# Patient Record
Sex: MALE
[De-identification: names, ages, dates, MRNs, and addresses within clinical notes are randomized per-mention and may not be internally consistent; named-entity substitution may affect disease eponyms.]

---

## 2018-11-18 ENCOUNTER — HOSPITAL ENCOUNTER (INPATIENT)
Dept: HOSPITAL 90 - EDH | Age: 33
LOS: 12 days | Discharge: HOME | DRG: 854 | End: 2018-11-30
Attending: FAMILY MEDICINE | Admitting: FAMILY MEDICINE
Payer: COMMERCIAL

## 2018-11-18 VITALS — SYSTOLIC BLOOD PRESSURE: 162 MMHG | DIASTOLIC BLOOD PRESSURE: 89 MMHG

## 2018-11-18 VITALS — DIASTOLIC BLOOD PRESSURE: 73 MMHG | SYSTOLIC BLOOD PRESSURE: 135 MMHG

## 2018-11-18 VITALS — HEIGHT: 74 IN | WEIGHT: 310.1 LBS | BODY MASS INDEX: 39.8 KG/M2

## 2018-11-18 VITALS — SYSTOLIC BLOOD PRESSURE: 160 MMHG | DIASTOLIC BLOOD PRESSURE: 85 MMHG

## 2018-11-18 DIAGNOSIS — E11.69: ICD-10-CM

## 2018-11-18 DIAGNOSIS — M86.171: ICD-10-CM

## 2018-11-18 DIAGNOSIS — L97.519: ICD-10-CM

## 2018-11-18 DIAGNOSIS — Z79.01: ICD-10-CM

## 2018-11-18 DIAGNOSIS — L97.529: ICD-10-CM

## 2018-11-18 DIAGNOSIS — B95.4: ICD-10-CM

## 2018-11-18 DIAGNOSIS — Z91.14: ICD-10-CM

## 2018-11-18 DIAGNOSIS — E66.01: ICD-10-CM

## 2018-11-18 DIAGNOSIS — I10: ICD-10-CM

## 2018-11-18 DIAGNOSIS — E87.6: ICD-10-CM

## 2018-11-18 DIAGNOSIS — E11.65: ICD-10-CM

## 2018-11-18 DIAGNOSIS — B96.6: ICD-10-CM

## 2018-11-18 DIAGNOSIS — Z89.429: ICD-10-CM

## 2018-11-18 DIAGNOSIS — Z83.3: ICD-10-CM

## 2018-11-18 DIAGNOSIS — B95.8: ICD-10-CM

## 2018-11-18 DIAGNOSIS — B96.4: ICD-10-CM

## 2018-11-18 DIAGNOSIS — L02.611: ICD-10-CM

## 2018-11-18 DIAGNOSIS — E78.5: ICD-10-CM

## 2018-11-18 DIAGNOSIS — Z87.891: ICD-10-CM

## 2018-11-18 DIAGNOSIS — E11.621: ICD-10-CM

## 2018-11-18 DIAGNOSIS — Z86.718: ICD-10-CM

## 2018-11-18 DIAGNOSIS — Z79.4: ICD-10-CM

## 2018-11-18 DIAGNOSIS — L03.116: ICD-10-CM

## 2018-11-18 DIAGNOSIS — B95.1: ICD-10-CM

## 2018-11-18 DIAGNOSIS — A41.9: Primary | ICD-10-CM

## 2018-11-18 DIAGNOSIS — B95.2: ICD-10-CM

## 2018-11-18 DIAGNOSIS — Z88.8: ICD-10-CM

## 2018-11-18 DIAGNOSIS — K52.9: ICD-10-CM

## 2018-11-18 DIAGNOSIS — L03.115: ICD-10-CM

## 2018-11-18 LAB
ALBUMIN SERPL-MCNC: 3.1 G/DL (ref 3.5–5)
ALT SERPL-CCNC: 18 U/L (ref 12–78)
APPEARANCE UR: CLEAR
AST SERPL-CCNC: 12 U/L (ref 10–37)
BASOPHILS NFR BLD AUTO: 0.4 % (ref 0–5)
BILIRUB SERPL-MCNC: 1.1 MG/DL (ref 0.2–1)
BILIRUB UR QL STRIP: NEGATIVE
BUN SERPL-MCNC: 18 MG/DL (ref 7–18)
CHLORIDE SERPL-SCNC: 93 MMOL/L (ref 101–111)
CO2 SERPL-SCNC: 29 MMOL/L (ref 21–32)
COLOR UR: YELLOW
CREAT SERPL-MCNC: 1.2 MG/DL (ref 0.5–1.5)
EOSINOPHIL NFR BLD AUTO: 0.3 % (ref 0–8)
ERYTHROCYTE [DISTWIDTH] IN BLOOD BY AUTOMATED COUNT: 13.4 % (ref 11–15.5)
GFR SERPL CREATININE-BSD FRML MDRD: 73 ML/MIN (ref 60–?)
GLUCOSE SERPL-MCNC: 460 MG/DL (ref 70–105)
GLUCOSE UR STRIP-MCNC: >=1000 MG/DL
HCT VFR BLD AUTO: 32.1 % (ref 42–54)
HGB UR QL STRIP: (no result)
KETONES UR STRIP-MCNC: 15 MG/DL
LEUKOCYTE ESTERASE UR QL STRIP: NEGATIVE
LYMPHOCYTES NFR SPEC AUTO: 5.5 % (ref 21–51)
MCH RBC QN AUTO: 25.6 PG (ref 27–33)
MCHC RBC AUTO-ENTMCNC: 32.3 G/DL (ref 32–36)
MCV RBC AUTO: 79.5 FL (ref 79–99)
MONOCYTES NFR BLD AUTO: 5.1 % (ref 3–13)
NEUTROPHILS NFR BLD AUTO: 88.7 % (ref 40–77)
NITRITE UR QL STRIP: NEGATIVE
NRBC BLD MANUAL-RTO: 0 % (ref 0–0.19)
PH UR STRIP: 5.5 [PH] (ref 5–8)
PLATELET # BLD AUTO: 289 K/UL (ref 130–400)
POTASSIUM SERPL-SCNC: 3.7 MMOL/L (ref 3.5–5.1)
PROT SERPL-MCNC: 8.2 G/DL (ref 6–8.3)
PROT UR QL STRIP: 30
RBC # BLD AUTO: 4.04 MIL/UL (ref 4.5–6.2)
RBC #/AREA URNS HPF: (no result) /HPF (ref 0–1)
SODIUM SERPL-SCNC: 130 MMOL/L (ref 136–145)
SP GR UR STRIP: 1.01 (ref 1–1.03)
UROBILINOGEN UR STRIP-MCNC: 1 MG/DL (ref 0.2–1)
WBC # BLD AUTO: 16.9 K/UL (ref 4.8–10.8)

## 2018-11-18 PROCEDURE — 99291 CRITICAL CARE FIRST HOUR: CPT

## 2018-11-18 PROCEDURE — 97039 UNLISTED MODALITY: CPT

## 2018-11-18 PROCEDURE — 83690 ASSAY OF LIPASE: CPT

## 2018-11-18 PROCEDURE — 93005 ELECTROCARDIOGRAM TRACING: CPT

## 2018-11-18 PROCEDURE — 87186 SC STD MICRODIL/AGAR DIL: CPT

## 2018-11-18 PROCEDURE — 74176 CT ABD & PELVIS W/O CONTRAST: CPT

## 2018-11-18 PROCEDURE — 87070 CULTURE OTHR SPECIMN AEROBIC: CPT

## 2018-11-18 PROCEDURE — 81001 URINALYSIS AUTO W/SCOPE: CPT

## 2018-11-18 PROCEDURE — 87804 INFLUENZA ASSAY W/OPTIC: CPT

## 2018-11-18 PROCEDURE — 85027 COMPLETE CBC AUTOMATED: CPT

## 2018-11-18 PROCEDURE — 87077 CULTURE AEROBIC IDENTIFY: CPT

## 2018-11-18 PROCEDURE — 73718 MRI LOWER EXTREMITY W/O DYE: CPT

## 2018-11-18 PROCEDURE — 88305 TISSUE EXAM BY PATHOLOGIST: CPT

## 2018-11-18 PROCEDURE — 93925 LOWER EXTREMITY STUDY: CPT

## 2018-11-18 PROCEDURE — 36415 COLL VENOUS BLD VENIPUNCTURE: CPT

## 2018-11-18 PROCEDURE — 80053 COMPREHEN METABOLIC PANEL: CPT

## 2018-11-18 PROCEDURE — 85025 COMPLETE CBC W/AUTO DIFF WBC: CPT

## 2018-11-18 PROCEDURE — 88311 DECALCIFY TISSUE: CPT

## 2018-11-18 PROCEDURE — 87507 IADNA-DNA/RNA PROBE TQ 12-25: CPT

## 2018-11-18 PROCEDURE — 87076 CULTURE ANAEROBE IDENT EACH: CPT

## 2018-11-18 PROCEDURE — 73630 X-RAY EXAM OF FOOT: CPT

## 2018-11-18 PROCEDURE — 80202 ASSAY OF VANCOMYCIN: CPT

## 2018-11-18 PROCEDURE — 82948 REAGENT STRIP/BLOOD GLUCOSE: CPT

## 2018-11-18 PROCEDURE — 87040 BLOOD CULTURE FOR BACTERIA: CPT

## 2018-11-18 PROCEDURE — 87205 SMEAR GRAM STAIN: CPT

## 2018-11-18 PROCEDURE — 80048 BASIC METABOLIC PNL TOTAL CA: CPT

## 2018-11-18 RX ADMIN — SODIUM CHLORIDE PRN MG: 9 INJECTION, SOLUTION INTRAVENOUS at 23:35

## 2018-11-18 RX ADMIN — SODIUM CHLORIDE SCH MLS/HR: 0.9 INJECTION, SOLUTION INTRAVENOUS at 22:15

## 2018-11-18 RX ADMIN — SODIUM CHLORIDE SCH UNIT: 9 INJECTION, SOLUTION INTRAVENOUS at 21:00

## 2018-11-18 RX ADMIN — FAMOTIDINE SCH MG: 10 INJECTION INTRAVENOUS at 22:14

## 2018-11-18 RX ADMIN — PIPERACILLIN SODIUM AND TAZOBACTAM SODIUM SCH MLS/HR: .375; 3 INJECTION, POWDER, LYOPHILIZED, FOR SOLUTION INTRAVENOUS at 22:14

## 2018-11-18 RX ADMIN — ACETAMINOPHEN PRN MG: 325 TABLET, FILM COATED ORAL at 23:35

## 2018-11-18 RX ADMIN — APIXABAN SCH MG: 5 TABLET, FILM COATED ORAL at 22:14

## 2018-11-18 RX ADMIN — SODIUM CHLORIDE SCH MLS/HR: 0.9 INJECTION, SOLUTION INTRAVENOUS at 23:49

## 2018-11-18 RX ADMIN — SODIUM CHLORIDE SCH UNIT: 9 INJECTION, SOLUTION INTRAVENOUS at 22:41

## 2018-11-19 VITALS — DIASTOLIC BLOOD PRESSURE: 100 MMHG | SYSTOLIC BLOOD PRESSURE: 171 MMHG

## 2018-11-19 VITALS — SYSTOLIC BLOOD PRESSURE: 121 MMHG | DIASTOLIC BLOOD PRESSURE: 83 MMHG

## 2018-11-19 VITALS — DIASTOLIC BLOOD PRESSURE: 81 MMHG | SYSTOLIC BLOOD PRESSURE: 149 MMHG

## 2018-11-19 VITALS — DIASTOLIC BLOOD PRESSURE: 77 MMHG | SYSTOLIC BLOOD PRESSURE: 136 MMHG

## 2018-11-19 VITALS — SYSTOLIC BLOOD PRESSURE: 143 MMHG | DIASTOLIC BLOOD PRESSURE: 83 MMHG

## 2018-11-19 VITALS — DIASTOLIC BLOOD PRESSURE: 97 MMHG | SYSTOLIC BLOOD PRESSURE: 171 MMHG

## 2018-11-19 LAB
BUN SERPL-MCNC: 16 MG/DL (ref 7–18)
CHLORIDE SERPL-SCNC: 96 MMOL/L (ref 101–111)
CO2 SERPL-SCNC: 23 MMOL/L (ref 21–32)
CREAT SERPL-MCNC: 1.1 MG/DL (ref 0.5–1.5)
ERYTHROCYTE [DISTWIDTH] IN BLOOD BY AUTOMATED COUNT: 13.3 % (ref 11–15.5)
GFR SERPL CREATININE-BSD FRML MDRD: 81 ML/MIN (ref 60–?)
GLUCOSE SERPL-MCNC: 341 MG/DL (ref 70–105)
HCT VFR BLD AUTO: 26.7 % (ref 42–54)
MCH RBC QN AUTO: 26.1 PG (ref 27–33)
MCHC RBC AUTO-ENTMCNC: 32.8 G/DL (ref 32–36)
MCV RBC AUTO: 79.8 FL (ref 79–99)
NRBC BLD MANUAL-RTO: 0 % (ref 0–0.19)
PLATELET # BLD AUTO: 268 K/UL (ref 130–400)
POTASSIUM SERPL-SCNC: 3.8 MMOL/L (ref 3.5–5.1)
RBC # BLD AUTO: 3.35 MIL/UL (ref 4.5–6.2)
SODIUM SERPL-SCNC: 130 MMOL/L (ref 136–145)
WBC # BLD AUTO: 16 K/UL (ref 4.8–10.8)

## 2018-11-19 RX ADMIN — SODIUM CHLORIDE SCH UNIT: 9 INJECTION, SOLUTION INTRAVENOUS at 22:14

## 2018-11-19 RX ADMIN — SODIUM CHLORIDE SCH UNIT: 9 INJECTION, SOLUTION INTRAVENOUS at 17:03

## 2018-11-19 RX ADMIN — SODIUM CHLORIDE SCH UNIT: 9 INJECTION, SOLUTION INTRAVENOUS at 06:18

## 2018-11-19 RX ADMIN — SODIUM CHLORIDE SCH MLS/HR: 0.9 INJECTION, SOLUTION INTRAVENOUS at 09:05

## 2018-11-19 RX ADMIN — SODIUM CHLORIDE SCH MLS/HR: 9 INJECTION, SOLUTION INTRAVENOUS at 14:37

## 2018-11-19 RX ADMIN — APIXABAN SCH MG: 5 TABLET, FILM COATED ORAL at 09:05

## 2018-11-19 RX ADMIN — FAMOTIDINE SCH MG: 10 INJECTION INTRAVENOUS at 09:05

## 2018-11-19 RX ADMIN — INSULIN GLARGINE SCH UNITS: 100 INJECTION, SOLUTION SUBCUTANEOUS at 09:42

## 2018-11-19 RX ADMIN — PIPERACILLIN SODIUM AND TAZOBACTAM SODIUM SCH MLS/HR: .375; 3 INJECTION, POWDER, LYOPHILIZED, FOR SOLUTION INTRAVENOUS at 13:13

## 2018-11-19 RX ADMIN — APIXABAN SCH MG: 5 TABLET, FILM COATED ORAL at 22:07

## 2018-11-19 RX ADMIN — FAMOTIDINE SCH MG: 10 INJECTION INTRAVENOUS at 22:02

## 2018-11-19 RX ADMIN — PIPERACILLIN SODIUM AND TAZOBACTAM SODIUM SCH MLS/HR: .375; 3 INJECTION, POWDER, LYOPHILIZED, FOR SOLUTION INTRAVENOUS at 04:33

## 2018-11-19 RX ADMIN — SODIUM CHLORIDE SCH UNIT: 9 INJECTION, SOLUTION INTRAVENOUS at 11:42

## 2018-11-19 RX ADMIN — ACETAMINOPHEN PRN MG: 325 TABLET, FILM COATED ORAL at 15:36

## 2018-11-19 RX ADMIN — SODIUM CHLORIDE SCH MLS/HR: 0.9 INJECTION, SOLUTION INTRAVENOUS at 17:30

## 2018-11-19 RX ADMIN — SODIUM CHLORIDE PRN MG: 9 INJECTION, SOLUTION INTRAVENOUS at 11:13

## 2018-11-19 RX ADMIN — INSULIN GLARGINE SCH UNITS: 100 INJECTION, SOLUTION SUBCUTANEOUS at 22:15

## 2018-11-19 RX ADMIN — HYDRALAZINE HYDROCHLORIDE PRN MG: 20 INJECTION, SOLUTION INTRAMUSCULAR; INTRAVENOUS at 11:13

## 2018-11-19 RX ADMIN — ACETAMINOPHEN PRN MG: 325 TABLET, FILM COATED ORAL at 04:07

## 2018-11-19 RX ADMIN — PIPERACILLIN SODIUM AND TAZOBACTAM SODIUM SCH MLS/HR: .375; 3 INJECTION, POWDER, LYOPHILIZED, FOR SOLUTION INTRAVENOUS at 22:02

## 2018-11-20 VITALS — SYSTOLIC BLOOD PRESSURE: 163 MMHG | DIASTOLIC BLOOD PRESSURE: 95 MMHG

## 2018-11-20 VITALS — DIASTOLIC BLOOD PRESSURE: 84 MMHG | SYSTOLIC BLOOD PRESSURE: 150 MMHG

## 2018-11-20 VITALS — SYSTOLIC BLOOD PRESSURE: 134 MMHG | DIASTOLIC BLOOD PRESSURE: 82 MMHG

## 2018-11-20 VITALS — SYSTOLIC BLOOD PRESSURE: 160 MMHG | DIASTOLIC BLOOD PRESSURE: 89 MMHG

## 2018-11-20 VITALS — DIASTOLIC BLOOD PRESSURE: 90 MMHG | SYSTOLIC BLOOD PRESSURE: 169 MMHG

## 2018-11-20 VITALS — SYSTOLIC BLOOD PRESSURE: 165 MMHG | DIASTOLIC BLOOD PRESSURE: 78 MMHG

## 2018-11-20 VITALS — SYSTOLIC BLOOD PRESSURE: 151 MMHG | DIASTOLIC BLOOD PRESSURE: 79 MMHG

## 2018-11-20 LAB
BUN SERPL-MCNC: 13 MG/DL (ref 7–18)
CHLORIDE SERPL-SCNC: 98 MMOL/L (ref 101–111)
CO2 SERPL-SCNC: 24 MMOL/L (ref 21–32)
CREAT SERPL-MCNC: 1 MG/DL (ref 0.5–1.5)
ERYTHROCYTE [DISTWIDTH] IN BLOOD BY AUTOMATED COUNT: 13.3 % (ref 11–15.5)
GFR SERPL CREATININE-BSD FRML MDRD: 90 ML/MIN (ref 60–?)
GLUCOSE SERPL-MCNC: 307 MG/DL (ref 70–105)
HCT VFR BLD AUTO: 24.7 % (ref 42–54)
MCH RBC QN AUTO: 26.1 PG (ref 27–33)
MCHC RBC AUTO-ENTMCNC: 32.8 G/DL (ref 32–36)
MCV RBC AUTO: 79.5 FL (ref 79–99)
NRBC BLD MANUAL-RTO: 0 % (ref 0–0.19)
PLATELET # BLD AUTO: 245 K/UL (ref 130–400)
POTASSIUM SERPL-SCNC: 3.4 MMOL/L (ref 3.5–5.1)
RBC # BLD AUTO: 3.11 MIL/UL (ref 4.5–6.2)
SODIUM SERPL-SCNC: 133 MMOL/L (ref 136–145)
WBC # BLD AUTO: 15 K/UL (ref 4.8–10.8)

## 2018-11-20 RX ADMIN — FAMOTIDINE SCH MG: 10 INJECTION INTRAVENOUS at 21:43

## 2018-11-20 RX ADMIN — APIXABAN SCH MG: 5 TABLET, FILM COATED ORAL at 08:12

## 2018-11-20 RX ADMIN — ACETAMINOPHEN PRN MG: 325 TABLET, FILM COATED ORAL at 16:21

## 2018-11-20 RX ADMIN — SODIUM CHLORIDE SCH UNIT: 9 INJECTION, SOLUTION INTRAVENOUS at 12:19

## 2018-11-20 RX ADMIN — INSULIN GLARGINE SCH UNITS: 100 INJECTION, SOLUTION SUBCUTANEOUS at 08:07

## 2018-11-20 RX ADMIN — PIPERACILLIN SODIUM AND TAZOBACTAM SODIUM SCH MLS/HR: .375; 3 INJECTION, POWDER, LYOPHILIZED, FOR SOLUTION INTRAVENOUS at 21:40

## 2018-11-20 RX ADMIN — DOCUSATE SODIUM SCH MG: 100 TABLET, FILM COATED ORAL at 21:45

## 2018-11-20 RX ADMIN — HYDRALAZINE HYDROCHLORIDE PRN MG: 20 INJECTION, SOLUTION INTRAMUSCULAR; INTRAVENOUS at 23:59

## 2018-11-20 RX ADMIN — SODIUM CHLORIDE SCH MLS/HR: 0.9 INJECTION, SOLUTION INTRAVENOUS at 03:02

## 2018-11-20 RX ADMIN — SODIUM CHLORIDE SCH MLS/HR: 9 INJECTION, SOLUTION INTRAVENOUS at 02:55

## 2018-11-20 RX ADMIN — SODIUM CHLORIDE SCH MLS/HR: 9 INJECTION, SOLUTION INTRAVENOUS at 15:33

## 2018-11-20 RX ADMIN — SODIUM CHLORIDE SCH UNIT: 9 INJECTION, SOLUTION INTRAVENOUS at 16:21

## 2018-11-20 RX ADMIN — SODIUM CHLORIDE SCH MLS/HR: 0.9 INJECTION, SOLUTION INTRAVENOUS at 07:55

## 2018-11-20 RX ADMIN — PIPERACILLIN SODIUM AND TAZOBACTAM SODIUM SCH MLS/HR: .375; 3 INJECTION, POWDER, LYOPHILIZED, FOR SOLUTION INTRAVENOUS at 12:26

## 2018-11-20 RX ADMIN — INSULIN GLARGINE SCH UNITS: 100 INJECTION, SOLUTION SUBCUTANEOUS at 21:52

## 2018-11-20 RX ADMIN — SODIUM CHLORIDE SCH MLS/HR: 0.9 INJECTION, SOLUTION INTRAVENOUS at 15:36

## 2018-11-20 RX ADMIN — SODIUM CHLORIDE SCH MLS/HR: 0.9 INJECTION, SOLUTION INTRAVENOUS at 23:55

## 2018-11-20 RX ADMIN — SODIUM CHLORIDE SCH UNIT: 9 INJECTION, SOLUTION INTRAVENOUS at 08:08

## 2018-11-20 RX ADMIN — SODIUM CHLORIDE SCH UNIT: 9 INJECTION, SOLUTION INTRAVENOUS at 21:54

## 2018-11-20 RX ADMIN — PIPERACILLIN SODIUM AND TAZOBACTAM SODIUM SCH MLS/HR: .375; 3 INJECTION, POWDER, LYOPHILIZED, FOR SOLUTION INTRAVENOUS at 05:38

## 2018-11-20 RX ADMIN — FAMOTIDINE SCH MG: 10 INJECTION INTRAVENOUS at 08:10

## 2018-11-20 RX ADMIN — APIXABAN SCH MG: 5 TABLET, FILM COATED ORAL at 21:45

## 2018-11-21 VITALS — DIASTOLIC BLOOD PRESSURE: 83 MMHG | SYSTOLIC BLOOD PRESSURE: 143 MMHG

## 2018-11-21 VITALS — SYSTOLIC BLOOD PRESSURE: 173 MMHG | DIASTOLIC BLOOD PRESSURE: 92 MMHG

## 2018-11-21 VITALS — DIASTOLIC BLOOD PRESSURE: 91 MMHG | SYSTOLIC BLOOD PRESSURE: 155 MMHG

## 2018-11-21 VITALS — SYSTOLIC BLOOD PRESSURE: 149 MMHG | DIASTOLIC BLOOD PRESSURE: 94 MMHG

## 2018-11-21 VITALS — DIASTOLIC BLOOD PRESSURE: 76 MMHG | SYSTOLIC BLOOD PRESSURE: 131 MMHG

## 2018-11-21 LAB
BASOPHILS NFR BLD AUTO: 0.3 % (ref 0–5)
BUN SERPL-MCNC: 11 MG/DL (ref 7–18)
CHLORIDE SERPL-SCNC: 98 MMOL/L (ref 101–111)
CO2 SERPL-SCNC: 24 MMOL/L (ref 21–32)
CREAT SERPL-MCNC: 0.9 MG/DL (ref 0.5–1.5)
EOSINOPHIL NFR BLD AUTO: 0.7 % (ref 0–8)
ERYTHROCYTE [DISTWIDTH] IN BLOOD BY AUTOMATED COUNT: 13.4 % (ref 11–15.5)
GFR SERPL CREATININE-BSD FRML MDRD: 102 ML/MIN (ref 60–?)
GLUCOSE SERPL-MCNC: 272 MG/DL (ref 70–105)
HCT VFR BLD AUTO: 24.7 % (ref 42–54)
LYMPHOCYTES NFR SPEC AUTO: 13.1 % (ref 21–51)
MCH RBC QN AUTO: 26.4 PG (ref 27–33)
MCHC RBC AUTO-ENTMCNC: 33.5 G/DL (ref 32–36)
MCV RBC AUTO: 78.9 FL (ref 79–99)
MONOCYTES NFR BLD AUTO: 7.5 % (ref 3–13)
NEUTROPHILS NFR BLD AUTO: 78.4 % (ref 40–77)
NRBC BLD MANUAL-RTO: 0 % (ref 0–0.19)
PLATELET # BLD AUTO: 276 K/UL (ref 130–400)
POTASSIUM SERPL-SCNC: 3.2 MMOL/L (ref 3.5–5.1)
RBC # BLD AUTO: 3.13 MIL/UL (ref 4.5–6.2)
SODIUM SERPL-SCNC: 132 MMOL/L (ref 136–145)
WBC # BLD AUTO: 12.1 K/UL (ref 4.8–10.8)

## 2018-11-21 RX ADMIN — SODIUM CHLORIDE SCH UNIT: 9 INJECTION, SOLUTION INTRAVENOUS at 18:06

## 2018-11-21 RX ADMIN — METFORMIN HYDROCHLORIDE SCH MG: 850 TABLET, FILM COATED ORAL at 21:17

## 2018-11-21 RX ADMIN — DOCUSATE SODIUM SCH MG: 100 TABLET, FILM COATED ORAL at 11:25

## 2018-11-21 RX ADMIN — ACETAMINOPHEN PRN MG: 325 TABLET, FILM COATED ORAL at 00:00

## 2018-11-21 RX ADMIN — LINAGLIPTIN SCH MG: 5 TABLET, FILM COATED ORAL at 11:25

## 2018-11-21 RX ADMIN — SODIUM CHLORIDE SCH MLS/HR: 0.9 INJECTION, SOLUTION INTRAVENOUS at 11:27

## 2018-11-21 RX ADMIN — SODIUM CHLORIDE SCH UNIT: 9 INJECTION, SOLUTION INTRAVENOUS at 21:30

## 2018-11-21 RX ADMIN — INSULIN GLARGINE SCH UNITS: 100 INJECTION, SOLUTION SUBCUTANEOUS at 06:34

## 2018-11-21 RX ADMIN — PIPERACILLIN SODIUM AND TAZOBACTAM SODIUM SCH MLS/HR: .375; 3 INJECTION, POWDER, LYOPHILIZED, FOR SOLUTION INTRAVENOUS at 21:14

## 2018-11-21 RX ADMIN — APIXABAN SCH MG: 5 TABLET, FILM COATED ORAL at 21:17

## 2018-11-21 RX ADMIN — PIPERACILLIN SODIUM AND TAZOBACTAM SODIUM SCH MLS/HR: .375; 3 INJECTION, POWDER, LYOPHILIZED, FOR SOLUTION INTRAVENOUS at 05:14

## 2018-11-21 RX ADMIN — APIXABAN SCH MG: 5 TABLET, FILM COATED ORAL at 11:25

## 2018-11-21 RX ADMIN — INSULIN GLARGINE SCH UNITS: 100 INJECTION, SOLUTION SUBCUTANEOUS at 21:31

## 2018-11-21 RX ADMIN — FAMOTIDINE SCH MG: 10 INJECTION INTRAVENOUS at 21:18

## 2018-11-21 RX ADMIN — SODIUM CHLORIDE SCH MLS/HR: 9 INJECTION, SOLUTION INTRAVENOUS at 21:14

## 2018-11-21 RX ADMIN — SODIUM CHLORIDE SCH MLS/HR: 9 INJECTION, SOLUTION INTRAVENOUS at 02:25

## 2018-11-21 RX ADMIN — SODIUM CHLORIDE SCH UNIT: 9 INJECTION, SOLUTION INTRAVENOUS at 06:33

## 2018-11-21 RX ADMIN — METFORMIN HYDROCHLORIDE SCH MG: 850 TABLET, FILM COATED ORAL at 14:44

## 2018-11-21 RX ADMIN — SODIUM CHLORIDE SCH UNIT: 9 INJECTION, SOLUTION INTRAVENOUS at 11:30

## 2018-11-21 RX ADMIN — Medication SCH APPL: at 04:00

## 2018-11-21 RX ADMIN — SODIUM CHLORIDE SCH MLS/HR: 9 INJECTION, SOLUTION INTRAVENOUS at 11:24

## 2018-11-21 RX ADMIN — PIPERACILLIN SODIUM AND TAZOBACTAM SODIUM SCH MLS/HR: .375; 3 INJECTION, POWDER, LYOPHILIZED, FOR SOLUTION INTRAVENOUS at 14:44

## 2018-11-21 RX ADMIN — ATORVASTATIN CALCIUM SCH MG: 40 TABLET, FILM COATED ORAL at 21:18

## 2018-11-21 RX ADMIN — FAMOTIDINE SCH MG: 10 INJECTION INTRAVENOUS at 11:26

## 2018-11-21 RX ADMIN — METFORMIN HYDROCHLORIDE SCH MG: 850 TABLET, FILM COATED ORAL at 11:25

## 2018-11-21 RX ADMIN — LISINOPRIL SCH MG: 20 TABLET ORAL at 11:25

## 2018-11-21 RX ADMIN — GLIMEPIRIDE SCH MG: 2 TABLET ORAL at 11:26

## 2018-11-21 RX ADMIN — DOCUSATE SODIUM SCH MG: 100 TABLET, FILM COATED ORAL at 21:18

## 2018-11-22 VITALS — SYSTOLIC BLOOD PRESSURE: 175 MMHG | DIASTOLIC BLOOD PRESSURE: 97 MMHG

## 2018-11-22 VITALS — SYSTOLIC BLOOD PRESSURE: 156 MMHG | DIASTOLIC BLOOD PRESSURE: 92 MMHG

## 2018-11-22 VITALS — SYSTOLIC BLOOD PRESSURE: 144 MMHG | DIASTOLIC BLOOD PRESSURE: 71 MMHG

## 2018-11-22 VITALS — SYSTOLIC BLOOD PRESSURE: 137 MMHG | DIASTOLIC BLOOD PRESSURE: 79 MMHG

## 2018-11-22 VITALS — SYSTOLIC BLOOD PRESSURE: 150 MMHG | DIASTOLIC BLOOD PRESSURE: 88 MMHG

## 2018-11-22 VITALS — DIASTOLIC BLOOD PRESSURE: 85 MMHG | SYSTOLIC BLOOD PRESSURE: 148 MMHG

## 2018-11-22 LAB
BUN SERPL-MCNC: 11 MG/DL (ref 7–18)
CHLORIDE SERPL-SCNC: 104 MMOL/L (ref 101–111)
CO2 SERPL-SCNC: 24 MMOL/L (ref 21–32)
CREAT SERPL-MCNC: 0.9 MG/DL (ref 0.5–1.5)
ERYTHROCYTE [DISTWIDTH] IN BLOOD BY AUTOMATED COUNT: 13.2 % (ref 11–15.5)
GFR SERPL CREATININE-BSD FRML MDRD: 102 ML/MIN (ref 60–?)
GLUCOSE SERPL-MCNC: 248 MG/DL (ref 70–105)
HCT VFR BLD AUTO: 23.4 % (ref 42–54)
MCH RBC QN AUTO: 25.6 PG (ref 27–33)
MCHC RBC AUTO-ENTMCNC: 32.7 G/DL (ref 32–36)
MCV RBC AUTO: 78.2 FL (ref 79–99)
NRBC BLD MANUAL-RTO: 0 % (ref 0–0.19)
PLATELET # BLD AUTO: 284 K/UL (ref 130–400)
POTASSIUM SERPL-SCNC: 3.1 MMOL/L (ref 3.5–5.1)
RBC # BLD AUTO: 3 MIL/UL (ref 4.5–6.2)
SODIUM SERPL-SCNC: 138 MMOL/L (ref 136–145)
WBC # BLD AUTO: 11.3 K/UL (ref 4.8–10.8)

## 2018-11-22 RX ADMIN — METFORMIN HYDROCHLORIDE SCH MG: 850 TABLET, FILM COATED ORAL at 21:39

## 2018-11-22 RX ADMIN — FAMOTIDINE SCH MG: 10 INJECTION INTRAVENOUS at 21:41

## 2018-11-22 RX ADMIN — Medication SCH APPL: at 08:43

## 2018-11-22 RX ADMIN — INSULIN GLARGINE SCH UNITS: 100 INJECTION, SOLUTION SUBCUTANEOUS at 07:02

## 2018-11-22 RX ADMIN — SODIUM CHLORIDE SCH MLS/HR: 0.9 INJECTION, SOLUTION INTRAVENOUS at 05:47

## 2018-11-22 RX ADMIN — PIPERACILLIN SODIUM AND TAZOBACTAM SODIUM SCH MLS/HR: .375; 3 INJECTION, POWDER, LYOPHILIZED, FOR SOLUTION INTRAVENOUS at 12:44

## 2018-11-22 RX ADMIN — APIXABAN SCH MG: 5 TABLET, FILM COATED ORAL at 08:44

## 2018-11-22 RX ADMIN — LISINOPRIL SCH MG: 20 TABLET ORAL at 08:44

## 2018-11-22 RX ADMIN — POTASSIUM CHLORIDE PRN MEQ: 1500 TABLET, EXTENDED RELEASE ORAL at 21:40

## 2018-11-22 RX ADMIN — GLIMEPIRIDE SCH MG: 2 TABLET ORAL at 08:45

## 2018-11-22 RX ADMIN — SODIUM CHLORIDE SCH UNIT: 9 INJECTION, SOLUTION INTRAVENOUS at 12:48

## 2018-11-22 RX ADMIN — ATORVASTATIN CALCIUM SCH MG: 40 TABLET, FILM COATED ORAL at 21:39

## 2018-11-22 RX ADMIN — DOCUSATE SODIUM SCH MG: 100 TABLET, FILM COATED ORAL at 08:44

## 2018-11-22 RX ADMIN — APIXABAN SCH MG: 5 TABLET, FILM COATED ORAL at 21:39

## 2018-11-22 RX ADMIN — POTASSIUM CHLORIDE PRN MEQ: 1500 TABLET, EXTENDED RELEASE ORAL at 12:46

## 2018-11-22 RX ADMIN — SODIUM CHLORIDE SCH MLS/HR: 9 INJECTION, SOLUTION INTRAVENOUS at 08:42

## 2018-11-22 RX ADMIN — METFORMIN HYDROCHLORIDE SCH MG: 850 TABLET, FILM COATED ORAL at 08:44

## 2018-11-22 RX ADMIN — SODIUM CHLORIDE SCH UNIT: 9 INJECTION, SOLUTION INTRAVENOUS at 16:30

## 2018-11-22 RX ADMIN — DOCUSATE SODIUM SCH MG: 100 TABLET, FILM COATED ORAL at 21:00

## 2018-11-22 RX ADMIN — DOCUSATE SODIUM SCH MG: 100 TABLET, FILM COATED ORAL at 08:46

## 2018-11-22 RX ADMIN — PIPERACILLIN SODIUM AND TAZOBACTAM SODIUM SCH MLS/HR: .375; 3 INJECTION, POWDER, LYOPHILIZED, FOR SOLUTION INTRAVENOUS at 05:47

## 2018-11-22 RX ADMIN — POTASSIUM CHLORIDE PRN MEQ: 1500 TABLET, EXTENDED RELEASE ORAL at 15:28

## 2018-11-22 RX ADMIN — INSULIN GLARGINE SCH UNITS: 100 INJECTION, SOLUTION SUBCUTANEOUS at 21:49

## 2018-11-22 RX ADMIN — SODIUM CHLORIDE SCH MLS/HR: 9 INJECTION, SOLUTION INTRAVENOUS at 21:35

## 2018-11-22 RX ADMIN — SODIUM CHLORIDE SCH UNIT: 9 INJECTION, SOLUTION INTRAVENOUS at 21:51

## 2018-11-22 RX ADMIN — FAMOTIDINE SCH MG: 10 INJECTION INTRAVENOUS at 08:42

## 2018-11-22 RX ADMIN — METFORMIN HYDROCHLORIDE SCH MG: 850 TABLET, FILM COATED ORAL at 14:40

## 2018-11-22 RX ADMIN — SODIUM CHLORIDE SCH UNIT: 9 INJECTION, SOLUTION INTRAVENOUS at 07:02

## 2018-11-22 RX ADMIN — LINAGLIPTIN SCH MG: 5 TABLET, FILM COATED ORAL at 08:44

## 2018-11-22 RX ADMIN — POTASSIUM CHLORIDE PRN MEQ: 1500 TABLET, EXTENDED RELEASE ORAL at 23:51

## 2018-11-22 RX ADMIN — PIPERACILLIN SODIUM AND TAZOBACTAM SODIUM SCH MLS/HR: .375; 3 INJECTION, POWDER, LYOPHILIZED, FOR SOLUTION INTRAVENOUS at 21:34

## 2018-11-23 VITALS — DIASTOLIC BLOOD PRESSURE: 103 MMHG | SYSTOLIC BLOOD PRESSURE: 187 MMHG

## 2018-11-23 VITALS — DIASTOLIC BLOOD PRESSURE: 92 MMHG | SYSTOLIC BLOOD PRESSURE: 173 MMHG

## 2018-11-23 VITALS — SYSTOLIC BLOOD PRESSURE: 180 MMHG | DIASTOLIC BLOOD PRESSURE: 100 MMHG

## 2018-11-23 VITALS — SYSTOLIC BLOOD PRESSURE: 144 MMHG | DIASTOLIC BLOOD PRESSURE: 87 MMHG

## 2018-11-23 VITALS — DIASTOLIC BLOOD PRESSURE: 101 MMHG | SYSTOLIC BLOOD PRESSURE: 169 MMHG

## 2018-11-23 LAB
BUN SERPL-MCNC: 10 MG/DL (ref 7–18)
CHLORIDE SERPL-SCNC: 104 MMOL/L (ref 101–111)
CO2 SERPL-SCNC: 25 MMOL/L (ref 21–32)
CREAT SERPL-MCNC: 0.9 MG/DL (ref 0.5–1.5)
ERYTHROCYTE [DISTWIDTH] IN BLOOD BY AUTOMATED COUNT: 13.6 % (ref 11–15.5)
GFR SERPL CREATININE-BSD FRML MDRD: 102 ML/MIN (ref 60–?)
GLUCOSE SERPL-MCNC: 205 MG/DL (ref 70–105)
HCT VFR BLD AUTO: 24 % (ref 42–54)
MCH RBC QN AUTO: 26.8 PG (ref 27–33)
MCHC RBC AUTO-ENTMCNC: 33.9 G/DL (ref 32–36)
MCV RBC AUTO: 79 FL (ref 79–99)
NRBC BLD MANUAL-RTO: 0 % (ref 0–0.19)
PLATELET # BLD AUTO: 357 K/UL (ref 130–400)
POTASSIUM SERPL-SCNC: 3.2 MMOL/L (ref 3.5–5.1)
RBC # BLD AUTO: 3.03 MIL/UL (ref 4.5–6.2)
SODIUM SERPL-SCNC: 138 MMOL/L (ref 136–145)
WBC # BLD AUTO: 9.8 K/UL (ref 4.8–10.8)

## 2018-11-23 RX ADMIN — METFORMIN HYDROCHLORIDE SCH MG: 850 TABLET, FILM COATED ORAL at 20:59

## 2018-11-23 RX ADMIN — INSULIN GLARGINE SCH UNITS: 100 INJECTION, SOLUTION SUBCUTANEOUS at 06:32

## 2018-11-23 RX ADMIN — SODIUM CHLORIDE SCH UNIT: 9 INJECTION, SOLUTION INTRAVENOUS at 16:30

## 2018-11-23 RX ADMIN — SODIUM CHLORIDE SCH MLS/HR: 9 INJECTION, SOLUTION INTRAVENOUS at 18:05

## 2018-11-23 RX ADMIN — LISINOPRIL SCH MG: 20 TABLET ORAL at 10:44

## 2018-11-23 RX ADMIN — SODIUM CHLORIDE SCH UNIT: 9 INJECTION, SOLUTION INTRAVENOUS at 11:50

## 2018-11-23 RX ADMIN — APIXABAN SCH MG: 5 TABLET, FILM COATED ORAL at 10:44

## 2018-11-23 RX ADMIN — POTASSIUM CHLORIDE PRN MEQ: 1500 TABLET, EXTENDED RELEASE ORAL at 23:15

## 2018-11-23 RX ADMIN — INSULIN GLARGINE SCH UNITS: 100 INJECTION, SOLUTION SUBCUTANEOUS at 21:14

## 2018-11-23 RX ADMIN — FAMOTIDINE SCH MG: 10 INJECTION INTRAVENOUS at 10:45

## 2018-11-23 RX ADMIN — SODIUM CHLORIDE SCH UNIT: 9 INJECTION, SOLUTION INTRAVENOUS at 20:59

## 2018-11-23 RX ADMIN — SODIUM CHLORIDE SCH MLS/HR: 9 INJECTION, SOLUTION INTRAVENOUS at 10:43

## 2018-11-23 RX ADMIN — ATORVASTATIN CALCIUM SCH MG: 40 TABLET, FILM COATED ORAL at 20:59

## 2018-11-23 RX ADMIN — DOCUSATE SODIUM SCH MG: 100 TABLET, FILM COATED ORAL at 21:00

## 2018-11-23 RX ADMIN — GLIMEPIRIDE SCH MG: 2 TABLET ORAL at 10:44

## 2018-11-23 RX ADMIN — Medication SCH APPL: at 10:51

## 2018-11-23 RX ADMIN — POTASSIUM CHLORIDE PRN MEQ: 1500 TABLET, EXTENDED RELEASE ORAL at 13:15

## 2018-11-23 RX ADMIN — HYDRALAZINE HYDROCHLORIDE PRN MG: 20 INJECTION, SOLUTION INTRAMUSCULAR; INTRAVENOUS at 15:47

## 2018-11-23 RX ADMIN — POTASSIUM CHLORIDE PRN MEQ: 1500 TABLET, EXTENDED RELEASE ORAL at 18:05

## 2018-11-23 RX ADMIN — PIPERACILLIN SODIUM AND TAZOBACTAM SODIUM SCH MLS/HR: .375; 3 INJECTION, POWDER, LYOPHILIZED, FOR SOLUTION INTRAVENOUS at 13:14

## 2018-11-23 RX ADMIN — METFORMIN HYDROCHLORIDE SCH MG: 850 TABLET, FILM COATED ORAL at 13:15

## 2018-11-23 RX ADMIN — PIPERACILLIN SODIUM AND TAZOBACTAM SODIUM SCH MLS/HR: .375; 3 INJECTION, POWDER, LYOPHILIZED, FOR SOLUTION INTRAVENOUS at 05:09

## 2018-11-23 RX ADMIN — POTASSIUM CHLORIDE PRN MEQ: 1500 TABLET, EXTENDED RELEASE ORAL at 15:47

## 2018-11-23 RX ADMIN — METFORMIN HYDROCHLORIDE SCH MG: 850 TABLET, FILM COATED ORAL at 10:43

## 2018-11-23 RX ADMIN — FAMOTIDINE SCH MG: 10 INJECTION INTRAVENOUS at 20:58

## 2018-11-23 RX ADMIN — PIPERACILLIN SODIUM AND TAZOBACTAM SODIUM SCH MLS/HR: .375; 3 INJECTION, POWDER, LYOPHILIZED, FOR SOLUTION INTRAVENOUS at 20:58

## 2018-11-23 RX ADMIN — LINAGLIPTIN SCH MG: 5 TABLET, FILM COATED ORAL at 10:44

## 2018-11-23 RX ADMIN — SODIUM CHLORIDE SCH UNIT: 9 INJECTION, SOLUTION INTRAVENOUS at 06:31

## 2018-11-23 RX ADMIN — DOCUSATE SODIUM SCH MG: 100 TABLET, FILM COATED ORAL at 09:00

## 2018-11-23 RX ADMIN — APIXABAN SCH MG: 5 TABLET, FILM COATED ORAL at 20:59

## 2018-11-24 VITALS — SYSTOLIC BLOOD PRESSURE: 129 MMHG | DIASTOLIC BLOOD PRESSURE: 77 MMHG

## 2018-11-24 VITALS — SYSTOLIC BLOOD PRESSURE: 154 MMHG | DIASTOLIC BLOOD PRESSURE: 100 MMHG

## 2018-11-24 VITALS — SYSTOLIC BLOOD PRESSURE: 152 MMHG | DIASTOLIC BLOOD PRESSURE: 78 MMHG

## 2018-11-24 VITALS — DIASTOLIC BLOOD PRESSURE: 95 MMHG | SYSTOLIC BLOOD PRESSURE: 153 MMHG

## 2018-11-24 VITALS — SYSTOLIC BLOOD PRESSURE: 161 MMHG | DIASTOLIC BLOOD PRESSURE: 99 MMHG

## 2018-11-24 VITALS — DIASTOLIC BLOOD PRESSURE: 74 MMHG | SYSTOLIC BLOOD PRESSURE: 132 MMHG

## 2018-11-24 VITALS — SYSTOLIC BLOOD PRESSURE: 153 MMHG | DIASTOLIC BLOOD PRESSURE: 99 MMHG

## 2018-11-24 VITALS — SYSTOLIC BLOOD PRESSURE: 158 MMHG | DIASTOLIC BLOOD PRESSURE: 98 MMHG

## 2018-11-24 VITALS — DIASTOLIC BLOOD PRESSURE: 87 MMHG | SYSTOLIC BLOOD PRESSURE: 155 MMHG

## 2018-11-24 VITALS — SYSTOLIC BLOOD PRESSURE: 149 MMHG | DIASTOLIC BLOOD PRESSURE: 84 MMHG

## 2018-11-24 VITALS — SYSTOLIC BLOOD PRESSURE: 179 MMHG | DIASTOLIC BLOOD PRESSURE: 98 MMHG

## 2018-11-24 VITALS — DIASTOLIC BLOOD PRESSURE: 77 MMHG | SYSTOLIC BLOOD PRESSURE: 156 MMHG

## 2018-11-24 VITALS — DIASTOLIC BLOOD PRESSURE: 98 MMHG | SYSTOLIC BLOOD PRESSURE: 173 MMHG

## 2018-11-24 VITALS — SYSTOLIC BLOOD PRESSURE: 160 MMHG | DIASTOLIC BLOOD PRESSURE: 90 MMHG

## 2018-11-24 VITALS — DIASTOLIC BLOOD PRESSURE: 99 MMHG | SYSTOLIC BLOOD PRESSURE: 169 MMHG

## 2018-11-24 VITALS — DIASTOLIC BLOOD PRESSURE: 93 MMHG | SYSTOLIC BLOOD PRESSURE: 140 MMHG

## 2018-11-24 VITALS — DIASTOLIC BLOOD PRESSURE: 89 MMHG | SYSTOLIC BLOOD PRESSURE: 153 MMHG

## 2018-11-24 LAB
BUN SERPL-MCNC: 8 MG/DL (ref 7–18)
CHLORIDE SERPL-SCNC: 105 MMOL/L (ref 101–111)
CO2 SERPL-SCNC: 24 MMOL/L (ref 21–32)
CREAT SERPL-MCNC: 0.8 MG/DL (ref 0.5–1.5)
ERYTHROCYTE [DISTWIDTH] IN BLOOD BY AUTOMATED COUNT: 13.8 % (ref 11–15.5)
GFR SERPL CREATININE-BSD FRML MDRD: 117 ML/MIN (ref 60–?)
GLUCOSE SERPL-MCNC: 181 MG/DL (ref 70–105)
HCT VFR BLD AUTO: 25.1 % (ref 42–54)
MCH RBC QN AUTO: 25.7 PG (ref 27–33)
MCHC RBC AUTO-ENTMCNC: 32.5 G/DL (ref 32–36)
MCV RBC AUTO: 79 FL (ref 79–99)
NRBC BLD MANUAL-RTO: 0.1 % (ref 0–0.19)
PLATELET # BLD AUTO: 366 K/UL (ref 130–400)
POTASSIUM SERPL-SCNC: 3.7 MMOL/L (ref 3.5–5.1)
RBC # BLD AUTO: 3.17 MIL/UL (ref 4.5–6.2)
SODIUM SERPL-SCNC: 138 MMOL/L (ref 136–145)
WBC # BLD AUTO: 11.2 K/UL (ref 4.8–10.8)

## 2018-11-24 PROCEDURE — 0Y6M0ZC DETACHMENT AT RIGHT FOOT, PARTIAL 3RD RAY, OPEN APPROACH: ICD-10-PCS | Performed by: PODIATRIST

## 2018-11-24 PROCEDURE — 0LBN0ZZ EXCISION OF RIGHT LOWER LEG TENDON, OPEN APPROACH: ICD-10-PCS | Performed by: PODIATRIST

## 2018-11-24 PROCEDURE — 0Y6M0ZD DETACHMENT AT RIGHT FOOT, PARTIAL 4TH RAY, OPEN APPROACH: ICD-10-PCS | Performed by: PODIATRIST

## 2018-11-24 RX ADMIN — SODIUM CHLORIDE SCH UNIT: 9 INJECTION, SOLUTION INTRAVENOUS at 11:30

## 2018-11-24 RX ADMIN — HYDRALAZINE HYDROCHLORIDE PRN MG: 20 INJECTION, SOLUTION INTRAMUSCULAR; INTRAVENOUS at 16:59

## 2018-11-24 RX ADMIN — METFORMIN HYDROCHLORIDE SCH MG: 850 TABLET, FILM COATED ORAL at 09:37

## 2018-11-24 RX ADMIN — GLIMEPIRIDE SCH MG: 2 TABLET ORAL at 09:37

## 2018-11-24 RX ADMIN — METFORMIN HYDROCHLORIDE SCH MG: 850 TABLET, FILM COATED ORAL at 13:35

## 2018-11-24 RX ADMIN — SODIUM CHLORIDE SCH MLS/HR: 9 INJECTION, SOLUTION INTRAVENOUS at 09:36

## 2018-11-24 RX ADMIN — LISINOPRIL SCH MG: 20 TABLET ORAL at 09:37

## 2018-11-24 RX ADMIN — FAMOTIDINE SCH MG: 10 INJECTION INTRAVENOUS at 21:29

## 2018-11-24 RX ADMIN — APIXABAN SCH MG: 5 TABLET, FILM COATED ORAL at 21:52

## 2018-11-24 RX ADMIN — LINAGLIPTIN SCH MG: 5 TABLET, FILM COATED ORAL at 09:37

## 2018-11-24 RX ADMIN — SODIUM CHLORIDE SCH UNIT: 9 INJECTION, SOLUTION INTRAVENOUS at 06:17

## 2018-11-24 RX ADMIN — INSULIN GLARGINE SCH UNITS: 100 INJECTION, SOLUTION SUBCUTANEOUS at 21:32

## 2018-11-24 RX ADMIN — SODIUM CHLORIDE SCH MLS/HR: 9 INJECTION, SOLUTION INTRAVENOUS at 02:03

## 2018-11-24 RX ADMIN — ATORVASTATIN CALCIUM SCH MG: 40 TABLET, FILM COATED ORAL at 21:30

## 2018-11-24 RX ADMIN — FAMOTIDINE SCH MG: 10 INJECTION INTRAVENOUS at 09:36

## 2018-11-24 RX ADMIN — PIPERACILLIN SODIUM AND TAZOBACTAM SODIUM SCH MLS/HR: .375; 3 INJECTION, POWDER, LYOPHILIZED, FOR SOLUTION INTRAVENOUS at 05:05

## 2018-11-24 RX ADMIN — INSULIN GLARGINE SCH UNITS: 100 INJECTION, SOLUTION SUBCUTANEOUS at 06:50

## 2018-11-24 RX ADMIN — SODIUM CHLORIDE SCH UNIT: 9 INJECTION, SOLUTION INTRAVENOUS at 21:00

## 2018-11-24 RX ADMIN — HYDRALAZINE HYDROCHLORIDE PRN MG: 20 INJECTION, SOLUTION INTRAMUSCULAR; INTRAVENOUS at 05:04

## 2018-11-24 RX ADMIN — DOCUSATE SODIUM SCH MG: 100 TABLET, FILM COATED ORAL at 09:00

## 2018-11-24 RX ADMIN — Medication SCH APPL: at 09:46

## 2018-11-24 RX ADMIN — SODIUM CHLORIDE SCH MLS/HR: 9 INJECTION, SOLUTION INTRAVENOUS at 21:33

## 2018-11-24 RX ADMIN — APIXABAN SCH MG: 5 TABLET, FILM COATED ORAL at 09:38

## 2018-11-24 RX ADMIN — METFORMIN HYDROCHLORIDE SCH MG: 850 TABLET, FILM COATED ORAL at 21:29

## 2018-11-24 RX ADMIN — SODIUM CHLORIDE SCH UNIT: 9 INJECTION, SOLUTION INTRAVENOUS at 16:30

## 2018-11-24 RX ADMIN — PIPERACILLIN SODIUM AND TAZOBACTAM SODIUM SCH MLS/HR: .375; 3 INJECTION, POWDER, LYOPHILIZED, FOR SOLUTION INTRAVENOUS at 13:35

## 2018-11-24 RX ADMIN — PIPERACILLIN SODIUM AND TAZOBACTAM SODIUM SCH MLS/HR: .375; 3 INJECTION, POWDER, LYOPHILIZED, FOR SOLUTION INTRAVENOUS at 21:28

## 2018-11-24 RX ADMIN — DOCUSATE SODIUM SCH MG: 100 TABLET, FILM COATED ORAL at 21:00

## 2018-11-25 VITALS — DIASTOLIC BLOOD PRESSURE: 80 MMHG | SYSTOLIC BLOOD PRESSURE: 137 MMHG

## 2018-11-25 VITALS — DIASTOLIC BLOOD PRESSURE: 81 MMHG | SYSTOLIC BLOOD PRESSURE: 151 MMHG

## 2018-11-25 VITALS — DIASTOLIC BLOOD PRESSURE: 95 MMHG | SYSTOLIC BLOOD PRESSURE: 161 MMHG

## 2018-11-25 VITALS — DIASTOLIC BLOOD PRESSURE: 96 MMHG | SYSTOLIC BLOOD PRESSURE: 147 MMHG

## 2018-11-25 VITALS — DIASTOLIC BLOOD PRESSURE: 76 MMHG | SYSTOLIC BLOOD PRESSURE: 120 MMHG

## 2018-11-25 VITALS — SYSTOLIC BLOOD PRESSURE: 137 MMHG | DIASTOLIC BLOOD PRESSURE: 98 MMHG

## 2018-11-25 VITALS — SYSTOLIC BLOOD PRESSURE: 142 MMHG | DIASTOLIC BLOOD PRESSURE: 86 MMHG

## 2018-11-25 VITALS — DIASTOLIC BLOOD PRESSURE: 86 MMHG | SYSTOLIC BLOOD PRESSURE: 150 MMHG

## 2018-11-25 VITALS — DIASTOLIC BLOOD PRESSURE: 76 MMHG | SYSTOLIC BLOOD PRESSURE: 131 MMHG

## 2018-11-25 LAB
BASOPHILS NFR BLD AUTO: 0.8 % (ref 0–5)
BUN SERPL-MCNC: 8 MG/DL (ref 7–18)
CHLORIDE SERPL-SCNC: 104 MMOL/L (ref 101–111)
CO2 SERPL-SCNC: 26 MMOL/L (ref 21–32)
CREAT SERPL-MCNC: 0.8 MG/DL (ref 0.5–1.5)
EOSINOPHIL NFR BLD AUTO: 1.2 % (ref 0–8)
ERYTHROCYTE [DISTWIDTH] IN BLOOD BY AUTOMATED COUNT: 13.6 % (ref 11–15.5)
GFR SERPL CREATININE-BSD FRML MDRD: 117 ML/MIN (ref 60–?)
GLUCOSE SERPL-MCNC: 151 MG/DL (ref 70–105)
HCT VFR BLD AUTO: 25.6 % (ref 42–54)
LYMPHOCYTES NFR SPEC AUTO: 12.5 % (ref 21–51)
MCH RBC QN AUTO: 26 PG (ref 27–33)
MCHC RBC AUTO-ENTMCNC: 33.2 G/DL (ref 32–36)
MCV RBC AUTO: 78.3 FL (ref 79–99)
MONOCYTES NFR BLD AUTO: 7.6 % (ref 3–13)
NEUTROPHILS NFR BLD AUTO: 77.9 % (ref 40–77)
NRBC BLD MANUAL-RTO: 0 % (ref 0–0.19)
PLATELET # BLD AUTO: 439 K/UL (ref 130–400)
POTASSIUM SERPL-SCNC: 3.4 MMOL/L (ref 3.5–5.1)
RBC # BLD AUTO: 3.27 MIL/UL (ref 4.5–6.2)
SODIUM SERPL-SCNC: 138 MMOL/L (ref 136–145)
WBC # BLD AUTO: 11.9 K/UL (ref 4.8–10.8)

## 2018-11-25 RX ADMIN — DOCUSATE SODIUM SCH MG: 100 TABLET, FILM COATED ORAL at 09:00

## 2018-11-25 RX ADMIN — FAMOTIDINE SCH MG: 10 INJECTION INTRAVENOUS at 22:05

## 2018-11-25 RX ADMIN — LINAGLIPTIN SCH MG: 5 TABLET, FILM COATED ORAL at 10:57

## 2018-11-25 RX ADMIN — ATORVASTATIN CALCIUM SCH MG: 40 TABLET, FILM COATED ORAL at 22:05

## 2018-11-25 RX ADMIN — PIPERACILLIN SODIUM AND TAZOBACTAM SODIUM SCH MLS/HR: .375; 3 INJECTION, POWDER, LYOPHILIZED, FOR SOLUTION INTRAVENOUS at 05:03

## 2018-11-25 RX ADMIN — GLIMEPIRIDE SCH MG: 2 TABLET ORAL at 10:58

## 2018-11-25 RX ADMIN — PIPERACILLIN SODIUM AND TAZOBACTAM SODIUM SCH MLS/HR: .375; 3 INJECTION, POWDER, LYOPHILIZED, FOR SOLUTION INTRAVENOUS at 22:06

## 2018-11-25 RX ADMIN — METFORMIN HYDROCHLORIDE SCH MG: 850 TABLET, FILM COATED ORAL at 22:05

## 2018-11-25 RX ADMIN — FAMOTIDINE SCH MG: 10 INJECTION INTRAVENOUS at 10:57

## 2018-11-25 RX ADMIN — INSULIN GLARGINE SCH UNITS: 100 INJECTION, SOLUTION SUBCUTANEOUS at 22:20

## 2018-11-25 RX ADMIN — APIXABAN SCH MG: 5 TABLET, FILM COATED ORAL at 13:09

## 2018-11-25 RX ADMIN — APIXABAN SCH MG: 5 TABLET, FILM COATED ORAL at 22:05

## 2018-11-25 RX ADMIN — INSULIN GLARGINE SCH UNITS: 100 INJECTION, SOLUTION SUBCUTANEOUS at 06:40

## 2018-11-25 RX ADMIN — POTASSIUM CHLORIDE PRN MEQ: 1500 TABLET, EXTENDED RELEASE ORAL at 14:25

## 2018-11-25 RX ADMIN — SODIUM CHLORIDE SCH UNIT: 9 INJECTION, SOLUTION INTRAVENOUS at 07:30

## 2018-11-25 RX ADMIN — SODIUM CHLORIDE SCH UNIT: 9 INJECTION, SOLUTION INTRAVENOUS at 16:30

## 2018-11-25 RX ADMIN — DOCUSATE SODIUM SCH MG: 100 TABLET, FILM COATED ORAL at 21:00

## 2018-11-25 RX ADMIN — SODIUM CHLORIDE SCH MLS/HR: 9 INJECTION, SOLUTION INTRAVENOUS at 05:00

## 2018-11-25 RX ADMIN — METFORMIN HYDROCHLORIDE SCH MG: 850 TABLET, FILM COATED ORAL at 10:57

## 2018-11-25 RX ADMIN — METFORMIN HYDROCHLORIDE SCH MG: 850 TABLET, FILM COATED ORAL at 14:24

## 2018-11-25 RX ADMIN — SODIUM CHLORIDE SCH MLS/HR: 9 INJECTION, SOLUTION INTRAVENOUS at 14:25

## 2018-11-25 RX ADMIN — POTASSIUM CHLORIDE PRN MEQ: 1500 TABLET, EXTENDED RELEASE ORAL at 10:58

## 2018-11-25 RX ADMIN — PIPERACILLIN SODIUM AND TAZOBACTAM SODIUM SCH MLS/HR: .375; 3 INJECTION, POWDER, LYOPHILIZED, FOR SOLUTION INTRAVENOUS at 13:08

## 2018-11-25 RX ADMIN — SODIUM CHLORIDE SCH UNIT: 9 INJECTION, SOLUTION INTRAVENOUS at 11:30

## 2018-11-25 RX ADMIN — SODIUM CHLORIDE SCH MLS/HR: 9 INJECTION, SOLUTION INTRAVENOUS at 22:16

## 2018-11-25 RX ADMIN — SODIUM CHLORIDE SCH UNIT: 9 INJECTION, SOLUTION INTRAVENOUS at 21:00

## 2018-11-25 RX ADMIN — LISINOPRIL SCH MG: 20 TABLET ORAL at 10:57

## 2018-11-26 VITALS — SYSTOLIC BLOOD PRESSURE: 126 MMHG | DIASTOLIC BLOOD PRESSURE: 72 MMHG

## 2018-11-26 VITALS — SYSTOLIC BLOOD PRESSURE: 140 MMHG | DIASTOLIC BLOOD PRESSURE: 79 MMHG

## 2018-11-26 VITALS — SYSTOLIC BLOOD PRESSURE: 146 MMHG | DIASTOLIC BLOOD PRESSURE: 93 MMHG

## 2018-11-26 VITALS — SYSTOLIC BLOOD PRESSURE: 139 MMHG | DIASTOLIC BLOOD PRESSURE: 77 MMHG

## 2018-11-26 VITALS — DIASTOLIC BLOOD PRESSURE: 89 MMHG | SYSTOLIC BLOOD PRESSURE: 152 MMHG

## 2018-11-26 VITALS — DIASTOLIC BLOOD PRESSURE: 72 MMHG | SYSTOLIC BLOOD PRESSURE: 131 MMHG

## 2018-11-26 LAB
BUN SERPL-MCNC: 7 MG/DL (ref 7–18)
CHLORIDE SERPL-SCNC: 105 MMOL/L (ref 101–111)
CO2 SERPL-SCNC: 24 MMOL/L (ref 21–32)
CREAT SERPL-MCNC: 0.9 MG/DL (ref 0.5–1.5)
ERYTHROCYTE [DISTWIDTH] IN BLOOD BY AUTOMATED COUNT: 13.8 % (ref 11–15.5)
GFR SERPL CREATININE-BSD FRML MDRD: 102 ML/MIN (ref 60–?)
GLUCOSE SERPL-MCNC: 115 MG/DL (ref 70–105)
HCT VFR BLD AUTO: 24.1 % (ref 42–54)
MCH RBC QN AUTO: 25.9 PG (ref 27–33)
MCHC RBC AUTO-ENTMCNC: 32.9 G/DL (ref 32–36)
MCV RBC AUTO: 78.5 FL (ref 79–99)
NRBC BLD MANUAL-RTO: 0 % (ref 0–0.19)
PLATELET # BLD AUTO: 391 K/UL (ref 130–400)
POTASSIUM SERPL-SCNC: 3.1 MMOL/L (ref 3.5–5.1)
RBC # BLD AUTO: 3.07 MIL/UL (ref 4.5–6.2)
SODIUM SERPL-SCNC: 139 MMOL/L (ref 136–145)
WBC # BLD AUTO: 10.8 K/UL (ref 4.8–10.8)

## 2018-11-26 RX ADMIN — APIXABAN SCH MG: 5 TABLET, FILM COATED ORAL at 09:50

## 2018-11-26 RX ADMIN — GLIMEPIRIDE SCH MG: 2 TABLET ORAL at 09:49

## 2018-11-26 RX ADMIN — POTASSIUM CHLORIDE PRN MEQ: 1500 TABLET, EXTENDED RELEASE ORAL at 16:07

## 2018-11-26 RX ADMIN — METFORMIN HYDROCHLORIDE SCH MG: 850 TABLET, FILM COATED ORAL at 13:18

## 2018-11-26 RX ADMIN — METFORMIN HYDROCHLORIDE SCH MG: 850 TABLET, FILM COATED ORAL at 18:26

## 2018-11-26 RX ADMIN — LINAGLIPTIN SCH MG: 5 TABLET, FILM COATED ORAL at 09:48

## 2018-11-26 RX ADMIN — FAMOTIDINE SCH MG: 10 INJECTION INTRAVENOUS at 23:07

## 2018-11-26 RX ADMIN — SODIUM CHLORIDE SCH MLS/HR: 9 INJECTION, SOLUTION INTRAVENOUS at 23:08

## 2018-11-26 RX ADMIN — SODIUM CHLORIDE SCH MLS/HR: 9 INJECTION, SOLUTION INTRAVENOUS at 05:35

## 2018-11-26 RX ADMIN — INSULIN GLARGINE SCH UNITS: 100 INJECTION, SOLUTION SUBCUTANEOUS at 06:48

## 2018-11-26 RX ADMIN — PIPERACILLIN SODIUM AND TAZOBACTAM SODIUM SCH MLS/HR: .375; 3 INJECTION, POWDER, LYOPHILIZED, FOR SOLUTION INTRAVENOUS at 13:15

## 2018-11-26 RX ADMIN — INSULIN GLARGINE SCH UNITS: 100 INJECTION, SOLUTION SUBCUTANEOUS at 21:00

## 2018-11-26 RX ADMIN — LISINOPRIL SCH MG: 20 TABLET ORAL at 09:49

## 2018-11-26 RX ADMIN — SODIUM CHLORIDE SCH UNIT: 9 INJECTION, SOLUTION INTRAVENOUS at 21:00

## 2018-11-26 RX ADMIN — PIPERACILLIN SODIUM AND TAZOBACTAM SODIUM SCH MLS/HR: .375; 3 INJECTION, POWDER, LYOPHILIZED, FOR SOLUTION INTRAVENOUS at 05:35

## 2018-11-26 RX ADMIN — DOCUSATE SODIUM SCH MG: 100 TABLET, FILM COATED ORAL at 21:00

## 2018-11-26 RX ADMIN — FAMOTIDINE SCH MG: 10 INJECTION INTRAVENOUS at 09:49

## 2018-11-26 RX ADMIN — PIPERACILLIN SODIUM AND TAZOBACTAM SODIUM SCH MLS/HR: .375; 3 INJECTION, POWDER, LYOPHILIZED, FOR SOLUTION INTRAVENOUS at 23:08

## 2018-11-26 RX ADMIN — APIXABAN SCH MG: 5 TABLET, FILM COATED ORAL at 23:08

## 2018-11-26 RX ADMIN — METFORMIN HYDROCHLORIDE SCH MG: 850 TABLET, FILM COATED ORAL at 09:49

## 2018-11-26 RX ADMIN — HYDROMORPHONE HYDROCHLORIDE PRN MG: 1 INJECTION, SOLUTION INTRAMUSCULAR; INTRAVENOUS; SUBCUTANEOUS at 13:23

## 2018-11-26 RX ADMIN — SODIUM CHLORIDE SCH MLS/HR: 9 INJECTION, SOLUTION INTRAVENOUS at 13:21

## 2018-11-26 RX ADMIN — SODIUM CHLORIDE SCH UNIT: 9 INJECTION, SOLUTION INTRAVENOUS at 11:30

## 2018-11-26 RX ADMIN — DOCUSATE SODIUM SCH MG: 100 TABLET, FILM COATED ORAL at 09:00

## 2018-11-26 RX ADMIN — SODIUM CHLORIDE SCH UNIT: 9 INJECTION, SOLUTION INTRAVENOUS at 16:30

## 2018-11-26 RX ADMIN — SODIUM CHLORIDE SCH UNIT: 9 INJECTION, SOLUTION INTRAVENOUS at 06:45

## 2018-11-26 RX ADMIN — POTASSIUM CHLORIDE PRN MEQ: 1500 TABLET, EXTENDED RELEASE ORAL at 13:15

## 2018-11-26 RX ADMIN — POTASSIUM CHLORIDE PRN MEQ: 1500 TABLET, EXTENDED RELEASE ORAL at 09:59

## 2018-11-26 RX ADMIN — ATORVASTATIN CALCIUM SCH MG: 40 TABLET, FILM COATED ORAL at 23:09

## 2018-11-27 VITALS — SYSTOLIC BLOOD PRESSURE: 151 MMHG | DIASTOLIC BLOOD PRESSURE: 91 MMHG

## 2018-11-27 VITALS — SYSTOLIC BLOOD PRESSURE: 160 MMHG | DIASTOLIC BLOOD PRESSURE: 97 MMHG

## 2018-11-27 VITALS — SYSTOLIC BLOOD PRESSURE: 178 MMHG | DIASTOLIC BLOOD PRESSURE: 99 MMHG

## 2018-11-27 VITALS — SYSTOLIC BLOOD PRESSURE: 140 MMHG | DIASTOLIC BLOOD PRESSURE: 84 MMHG

## 2018-11-27 VITALS — SYSTOLIC BLOOD PRESSURE: 123 MMHG | DIASTOLIC BLOOD PRESSURE: 70 MMHG

## 2018-11-27 LAB
BUN SERPL-MCNC: 6 MG/DL (ref 7–18)
CHLORIDE SERPL-SCNC: 106 MMOL/L (ref 101–111)
CO2 SERPL-SCNC: 26 MMOL/L (ref 21–32)
CREAT SERPL-MCNC: 0.9 MG/DL (ref 0.5–1.5)
ERYTHROCYTE [DISTWIDTH] IN BLOOD BY AUTOMATED COUNT: 13.7 % (ref 11–15.5)
GFR SERPL CREATININE-BSD FRML MDRD: 102 ML/MIN (ref 60–?)
GLUCOSE SERPL-MCNC: 99 MG/DL (ref 70–105)
HCT VFR BLD AUTO: 22.5 % (ref 42–54)
MCH RBC QN AUTO: 26.7 PG (ref 27–33)
MCHC RBC AUTO-ENTMCNC: 33.8 G/DL (ref 32–36)
MCV RBC AUTO: 78.8 FL (ref 79–99)
NRBC BLD MANUAL-RTO: 0.1 % (ref 0–0.19)
PLATELET # BLD AUTO: 398 K/UL (ref 130–400)
POTASSIUM SERPL-SCNC: 3.2 MMOL/L (ref 3.5–5.1)
RBC # BLD AUTO: 2.85 MIL/UL (ref 4.5–6.2)
SODIUM SERPL-SCNC: 142 MMOL/L (ref 136–145)
WBC # BLD AUTO: 9.1 K/UL (ref 4.8–10.8)

## 2018-11-27 RX ADMIN — SODIUM CHLORIDE SCH MLS/HR: 9 INJECTION, SOLUTION INTRAVENOUS at 14:26

## 2018-11-27 RX ADMIN — GLIMEPIRIDE SCH MG: 2 TABLET ORAL at 09:19

## 2018-11-27 RX ADMIN — INSULIN GLARGINE SCH UNITS: 100 INJECTION, SOLUTION SUBCUTANEOUS at 21:00

## 2018-11-27 RX ADMIN — SODIUM CHLORIDE SCH UNIT: 9 INJECTION, SOLUTION INTRAVENOUS at 05:49

## 2018-11-27 RX ADMIN — FAMOTIDINE SCH MG: 10 INJECTION INTRAVENOUS at 09:19

## 2018-11-27 RX ADMIN — SODIUM CHLORIDE SCH MLS/HR: 9 INJECTION, SOLUTION INTRAVENOUS at 06:54

## 2018-11-27 RX ADMIN — FAMOTIDINE SCH MG: 10 INJECTION INTRAVENOUS at 23:12

## 2018-11-27 RX ADMIN — INSULIN GLARGINE SCH UNITS: 100 INJECTION, SOLUTION SUBCUTANEOUS at 05:49

## 2018-11-27 RX ADMIN — SODIUM CHLORIDE SCH UNIT: 9 INJECTION, SOLUTION INTRAVENOUS at 11:30

## 2018-11-27 RX ADMIN — PIPERACILLIN SODIUM AND TAZOBACTAM SODIUM SCH MLS/HR: .375; 3 INJECTION, POWDER, LYOPHILIZED, FOR SOLUTION INTRAVENOUS at 14:22

## 2018-11-27 RX ADMIN — POTASSIUM CHLORIDE PRN MEQ: 1500 TABLET, EXTENDED RELEASE ORAL at 17:15

## 2018-11-27 RX ADMIN — HYDROMORPHONE HYDROCHLORIDE PRN MG: 1 INJECTION, SOLUTION INTRAMUSCULAR; INTRAVENOUS; SUBCUTANEOUS at 09:31

## 2018-11-27 RX ADMIN — METFORMIN HYDROCHLORIDE SCH MG: 850 TABLET, FILM COATED ORAL at 09:18

## 2018-11-27 RX ADMIN — POTASSIUM CHLORIDE PRN MEQ: 1500 TABLET, EXTENDED RELEASE ORAL at 14:23

## 2018-11-27 RX ADMIN — DOCUSATE SODIUM SCH MG: 100 TABLET, FILM COATED ORAL at 09:00

## 2018-11-27 RX ADMIN — HYDROMORPHONE HYDROCHLORIDE PRN MG: 1 INJECTION, SOLUTION INTRAMUSCULAR; INTRAVENOUS; SUBCUTANEOUS at 23:13

## 2018-11-27 RX ADMIN — SODIUM CHLORIDE SCH MLS/HR: 9 INJECTION, SOLUTION INTRAVENOUS at 23:14

## 2018-11-27 RX ADMIN — PIPERACILLIN SODIUM AND TAZOBACTAM SODIUM SCH MLS/HR: .375; 3 INJECTION, POWDER, LYOPHILIZED, FOR SOLUTION INTRAVENOUS at 23:13

## 2018-11-27 RX ADMIN — LINAGLIPTIN SCH MG: 5 TABLET, FILM COATED ORAL at 09:20

## 2018-11-27 RX ADMIN — POTASSIUM CHLORIDE PRN MEQ: 1500 TABLET, EXTENDED RELEASE ORAL at 19:01

## 2018-11-27 RX ADMIN — SODIUM CHLORIDE SCH UNIT: 9 INJECTION, SOLUTION INTRAVENOUS at 21:00

## 2018-11-27 RX ADMIN — SODIUM CHLORIDE SCH UNIT: 9 INJECTION, SOLUTION INTRAVENOUS at 16:30

## 2018-11-27 RX ADMIN — LISINOPRIL SCH MG: 20 TABLET ORAL at 09:19

## 2018-11-27 RX ADMIN — APIXABAN SCH MG: 5 TABLET, FILM COATED ORAL at 10:36

## 2018-11-27 RX ADMIN — ATORVASTATIN CALCIUM SCH MG: 40 TABLET, FILM COATED ORAL at 23:13

## 2018-11-27 RX ADMIN — METFORMIN HYDROCHLORIDE SCH MG: 850 TABLET, FILM COATED ORAL at 12:04

## 2018-11-27 RX ADMIN — APIXABAN SCH MG: 5 TABLET, FILM COATED ORAL at 23:13

## 2018-11-27 RX ADMIN — PIPERACILLIN SODIUM AND TAZOBACTAM SODIUM SCH MLS/HR: .375; 3 INJECTION, POWDER, LYOPHILIZED, FOR SOLUTION INTRAVENOUS at 05:31

## 2018-11-27 RX ADMIN — DOCUSATE SODIUM SCH MG: 100 TABLET, FILM COATED ORAL at 21:00

## 2018-11-27 RX ADMIN — METFORMIN HYDROCHLORIDE SCH MG: 850 TABLET, FILM COATED ORAL at 17:16

## 2018-11-28 VITALS — SYSTOLIC BLOOD PRESSURE: 158 MMHG | DIASTOLIC BLOOD PRESSURE: 101 MMHG

## 2018-11-28 VITALS — DIASTOLIC BLOOD PRESSURE: 98 MMHG | SYSTOLIC BLOOD PRESSURE: 139 MMHG

## 2018-11-28 VITALS — DIASTOLIC BLOOD PRESSURE: 90 MMHG | SYSTOLIC BLOOD PRESSURE: 167 MMHG

## 2018-11-28 VITALS — DIASTOLIC BLOOD PRESSURE: 92 MMHG | SYSTOLIC BLOOD PRESSURE: 159 MMHG

## 2018-11-28 VITALS — SYSTOLIC BLOOD PRESSURE: 137 MMHG | DIASTOLIC BLOOD PRESSURE: 89 MMHG

## 2018-11-28 VITALS — SYSTOLIC BLOOD PRESSURE: 150 MMHG | DIASTOLIC BLOOD PRESSURE: 82 MMHG

## 2018-11-28 LAB
BUN SERPL-MCNC: 8 MG/DL (ref 7–18)
CHLORIDE SERPL-SCNC: 107 MMOL/L (ref 101–111)
CO2 SERPL-SCNC: 26 MMOL/L (ref 21–32)
CREAT SERPL-MCNC: 0.8 MG/DL (ref 0.5–1.5)
ERYTHROCYTE [DISTWIDTH] IN BLOOD BY AUTOMATED COUNT: 14 % (ref 11–15.5)
GFR SERPL CREATININE-BSD FRML MDRD: 117 ML/MIN (ref 60–?)
GLUCOSE SERPL-MCNC: 103 MG/DL (ref 70–105)
HCT VFR BLD AUTO: 22 % (ref 42–54)
MCH RBC QN AUTO: 26.5 PG (ref 27–33)
MCHC RBC AUTO-ENTMCNC: 33.8 G/DL (ref 32–36)
MCV RBC AUTO: 78.3 FL (ref 79–99)
NRBC BLD MANUAL-RTO: 0.1 % (ref 0–0.19)
PLATELET # BLD AUTO: 394 K/UL (ref 130–400)
POTASSIUM SERPL-SCNC: 3.3 MMOL/L (ref 3.5–5.1)
RBC # BLD AUTO: 2.81 MIL/UL (ref 4.5–6.2)
SODIUM SERPL-SCNC: 142 MMOL/L (ref 136–145)
WBC # BLD AUTO: 9.4 K/UL (ref 4.8–10.8)

## 2018-11-28 RX ADMIN — METFORMIN HYDROCHLORIDE SCH MG: 850 TABLET, FILM COATED ORAL at 08:00

## 2018-11-28 RX ADMIN — DOCUSATE SODIUM SCH MG: 100 TABLET, FILM COATED ORAL at 12:46

## 2018-11-28 RX ADMIN — APIXABAN SCH MG: 5 TABLET, FILM COATED ORAL at 13:32

## 2018-11-28 RX ADMIN — FAMOTIDINE SCH MG: 10 INJECTION INTRAVENOUS at 21:58

## 2018-11-28 RX ADMIN — SODIUM CHLORIDE SCH UNIT: 9 INJECTION, SOLUTION INTRAVENOUS at 05:49

## 2018-11-28 RX ADMIN — LINAGLIPTIN SCH MG: 5 TABLET, FILM COATED ORAL at 12:46

## 2018-11-28 RX ADMIN — DOCUSATE SODIUM SCH MG: 100 TABLET, FILM COATED ORAL at 21:00

## 2018-11-28 RX ADMIN — APIXABAN SCH MG: 5 TABLET, FILM COATED ORAL at 21:57

## 2018-11-28 RX ADMIN — METFORMIN HYDROCHLORIDE SCH MG: 850 TABLET, FILM COATED ORAL at 17:39

## 2018-11-28 RX ADMIN — FAMOTIDINE SCH MG: 10 INJECTION INTRAVENOUS at 12:48

## 2018-11-28 RX ADMIN — ATORVASTATIN CALCIUM SCH MG: 40 TABLET, FILM COATED ORAL at 21:57

## 2018-11-28 RX ADMIN — PIPERACILLIN SODIUM AND TAZOBACTAM SODIUM SCH MLS/HR: .375; 3 INJECTION, POWDER, LYOPHILIZED, FOR SOLUTION INTRAVENOUS at 12:48

## 2018-11-28 RX ADMIN — SODIUM CHLORIDE SCH UNIT: 9 INJECTION, SOLUTION INTRAVENOUS at 11:30

## 2018-11-28 RX ADMIN — LISINOPRIL SCH MG: 20 TABLET ORAL at 12:46

## 2018-11-28 RX ADMIN — SODIUM CHLORIDE SCH UNIT: 9 INJECTION, SOLUTION INTRAVENOUS at 16:30

## 2018-11-28 RX ADMIN — SODIUM CHLORIDE SCH MLS/HR: 9 INJECTION, SOLUTION INTRAVENOUS at 05:24

## 2018-11-28 RX ADMIN — METFORMIN HYDROCHLORIDE SCH MG: 850 TABLET, FILM COATED ORAL at 12:46

## 2018-11-28 RX ADMIN — GLIMEPIRIDE SCH MG: 2 TABLET ORAL at 12:47

## 2018-11-28 RX ADMIN — SODIUM CHLORIDE SCH UNIT: 9 INJECTION, SOLUTION INTRAVENOUS at 21:00

## 2018-11-28 RX ADMIN — SODIUM CHLORIDE SCH MLS/HR: 9 INJECTION, SOLUTION INTRAVENOUS at 12:50

## 2018-11-28 RX ADMIN — SODIUM CHLORIDE SCH MLS/HR: 9 INJECTION, SOLUTION INTRAVENOUS at 22:39

## 2018-11-28 RX ADMIN — INSULIN GLARGINE SCH UNITS: 100 INJECTION, SOLUTION SUBCUTANEOUS at 21:00

## 2018-11-28 RX ADMIN — PIPERACILLIN SODIUM AND TAZOBACTAM SODIUM SCH MLS/HR: .375; 3 INJECTION, POWDER, LYOPHILIZED, FOR SOLUTION INTRAVENOUS at 21:58

## 2018-11-28 RX ADMIN — HYDROMORPHONE HYDROCHLORIDE PRN MG: 1 INJECTION, SOLUTION INTRAMUSCULAR; INTRAVENOUS; SUBCUTANEOUS at 12:45

## 2018-11-28 RX ADMIN — PIPERACILLIN SODIUM AND TAZOBACTAM SODIUM SCH MLS/HR: .375; 3 INJECTION, POWDER, LYOPHILIZED, FOR SOLUTION INTRAVENOUS at 05:23

## 2018-11-28 RX ADMIN — INSULIN GLARGINE SCH UNITS: 100 INJECTION, SOLUTION SUBCUTANEOUS at 05:49

## 2018-11-29 VITALS — SYSTOLIC BLOOD PRESSURE: 155 MMHG | DIASTOLIC BLOOD PRESSURE: 96 MMHG

## 2018-11-29 VITALS — DIASTOLIC BLOOD PRESSURE: 94 MMHG | SYSTOLIC BLOOD PRESSURE: 178 MMHG

## 2018-11-29 VITALS — SYSTOLIC BLOOD PRESSURE: 143 MMHG | DIASTOLIC BLOOD PRESSURE: 84 MMHG

## 2018-11-29 VITALS — DIASTOLIC BLOOD PRESSURE: 98 MMHG | SYSTOLIC BLOOD PRESSURE: 164 MMHG

## 2018-11-29 VITALS — SYSTOLIC BLOOD PRESSURE: 152 MMHG | DIASTOLIC BLOOD PRESSURE: 92 MMHG

## 2018-11-29 VITALS — DIASTOLIC BLOOD PRESSURE: 70 MMHG | SYSTOLIC BLOOD PRESSURE: 133 MMHG

## 2018-11-29 VITALS — SYSTOLIC BLOOD PRESSURE: 158 MMHG | DIASTOLIC BLOOD PRESSURE: 88 MMHG

## 2018-11-29 VITALS — DIASTOLIC BLOOD PRESSURE: 94 MMHG | SYSTOLIC BLOOD PRESSURE: 175 MMHG

## 2018-11-29 LAB
BASOPHILS NFR BLD AUTO: 0.4 % (ref 0–5)
BUN SERPL-MCNC: 10 MG/DL (ref 7–18)
CHLORIDE SERPL-SCNC: 104 MMOL/L (ref 101–111)
CO2 SERPL-SCNC: 28 MMOL/L (ref 21–32)
CREAT SERPL-MCNC: 0.9 MG/DL (ref 0.5–1.5)
EOSINOPHIL NFR BLD AUTO: 2 % (ref 0–8)
ERYTHROCYTE [DISTWIDTH] IN BLOOD BY AUTOMATED COUNT: 14 % (ref 11–15.5)
GFR SERPL CREATININE-BSD FRML MDRD: 102 ML/MIN (ref 60–?)
GLUCOSE SERPL-MCNC: 121 MG/DL (ref 70–105)
HCT VFR BLD AUTO: 25.4 % (ref 42–54)
LYMPHOCYTES NFR SPEC AUTO: 21.8 % (ref 21–51)
MCH RBC QN AUTO: 26.1 PG (ref 27–33)
MCHC RBC AUTO-ENTMCNC: 33.1 G/DL (ref 32–36)
MCV RBC AUTO: 78.9 FL (ref 79–99)
MONOCYTES NFR BLD AUTO: 7.3 % (ref 3–13)
NEUTROPHILS NFR BLD AUTO: 68.5 % (ref 40–77)
NRBC BLD MANUAL-RTO: 0 % (ref 0–0.19)
PLATELET # BLD AUTO: 481 K/UL (ref 130–400)
POTASSIUM SERPL-SCNC: 3.4 MMOL/L (ref 3.5–5.1)
RBC # BLD AUTO: 3.22 MIL/UL (ref 4.5–6.2)
SODIUM SERPL-SCNC: 142 MMOL/L (ref 136–145)
WBC # BLD AUTO: 9.7 K/UL (ref 4.8–10.8)

## 2018-11-29 RX ADMIN — INSULIN GLARGINE SCH UNITS: 100 INJECTION, SOLUTION SUBCUTANEOUS at 21:59

## 2018-11-29 RX ADMIN — SODIUM CHLORIDE SCH UNIT: 9 INJECTION, SOLUTION INTRAVENOUS at 21:00

## 2018-11-29 RX ADMIN — HYDROMORPHONE HYDROCHLORIDE PRN MG: 1 INJECTION, SOLUTION INTRAMUSCULAR; INTRAVENOUS; SUBCUTANEOUS at 10:19

## 2018-11-29 RX ADMIN — SODIUM CHLORIDE SCH MLS/HR: 9 INJECTION, SOLUTION INTRAVENOUS at 13:48

## 2018-11-29 RX ADMIN — SODIUM CHLORIDE SCH UNIT: 9 INJECTION, SOLUTION INTRAVENOUS at 06:09

## 2018-11-29 RX ADMIN — DOCUSATE SODIUM SCH MG: 100 TABLET, FILM COATED ORAL at 21:49

## 2018-11-29 RX ADMIN — METFORMIN HYDROCHLORIDE SCH MG: 850 TABLET, FILM COATED ORAL at 10:18

## 2018-11-29 RX ADMIN — APIXABAN SCH MG: 5 TABLET, FILM COATED ORAL at 10:13

## 2018-11-29 RX ADMIN — INSULIN GLARGINE SCH UNITS: 100 INJECTION, SOLUTION SUBCUTANEOUS at 06:09

## 2018-11-29 RX ADMIN — GLIMEPIRIDE SCH MG: 2 TABLET ORAL at 10:14

## 2018-11-29 RX ADMIN — SODIUM CHLORIDE SCH MLS/HR: 9 INJECTION, SOLUTION INTRAVENOUS at 05:15

## 2018-11-29 RX ADMIN — LINAGLIPTIN SCH MG: 5 TABLET, FILM COATED ORAL at 10:13

## 2018-11-29 RX ADMIN — METFORMIN HYDROCHLORIDE SCH MG: 850 TABLET, FILM COATED ORAL at 16:38

## 2018-11-29 RX ADMIN — METFORMIN HYDROCHLORIDE SCH MG: 850 TABLET, FILM COATED ORAL at 11:56

## 2018-11-29 RX ADMIN — SODIUM CHLORIDE SCH UNIT: 9 INJECTION, SOLUTION INTRAVENOUS at 16:30

## 2018-11-29 RX ADMIN — SODIUM CHLORIDE SCH UNIT: 9 INJECTION, SOLUTION INTRAVENOUS at 11:30

## 2018-11-29 RX ADMIN — HYDROMORPHONE HYDROCHLORIDE PRN MG: 1 INJECTION, SOLUTION INTRAMUSCULAR; INTRAVENOUS; SUBCUTANEOUS at 21:50

## 2018-11-29 RX ADMIN — ATORVASTATIN CALCIUM SCH MG: 40 TABLET, FILM COATED ORAL at 21:49

## 2018-11-29 RX ADMIN — PIPERACILLIN SODIUM AND TAZOBACTAM SODIUM SCH MLS/HR: .375; 3 INJECTION, POWDER, LYOPHILIZED, FOR SOLUTION INTRAVENOUS at 13:47

## 2018-11-29 RX ADMIN — PIPERACILLIN SODIUM AND TAZOBACTAM SODIUM SCH MLS/HR: .375; 3 INJECTION, POWDER, LYOPHILIZED, FOR SOLUTION INTRAVENOUS at 05:14

## 2018-11-29 RX ADMIN — APIXABAN SCH MG: 5 TABLET, FILM COATED ORAL at 21:49

## 2018-11-29 RX ADMIN — POTASSIUM CHLORIDE PRN MEQ: 1500 TABLET, EXTENDED RELEASE ORAL at 14:51

## 2018-11-29 RX ADMIN — SODIUM CHLORIDE SCH MLS/HR: 9 INJECTION, SOLUTION INTRAVENOUS at 22:03

## 2018-11-29 RX ADMIN — PIPERACILLIN SODIUM AND TAZOBACTAM SODIUM SCH MLS/HR: .375; 3 INJECTION, POWDER, LYOPHILIZED, FOR SOLUTION INTRAVENOUS at 21:49

## 2018-11-29 RX ADMIN — HYDROMORPHONE HYDROCHLORIDE PRN MG: 1 INJECTION, SOLUTION INTRAMUSCULAR; INTRAVENOUS; SUBCUTANEOUS at 05:17

## 2018-11-29 RX ADMIN — DOCUSATE SODIUM SCH MG: 100 TABLET, FILM COATED ORAL at 09:00

## 2018-11-29 RX ADMIN — LISINOPRIL SCH MG: 20 TABLET ORAL at 10:13

## 2018-11-29 RX ADMIN — POTASSIUM CHLORIDE PRN MEQ: 1500 TABLET, EXTENDED RELEASE ORAL at 17:11

## 2018-11-29 RX ADMIN — FAMOTIDINE SCH MG: 10 INJECTION INTRAVENOUS at 10:13

## 2018-11-29 RX ADMIN — FAMOTIDINE SCH MG: 10 INJECTION INTRAVENOUS at 21:49

## 2018-11-29 RX ADMIN — HYDROMORPHONE HYDROCHLORIDE PRN MG: 1 INJECTION, SOLUTION INTRAMUSCULAR; INTRAVENOUS; SUBCUTANEOUS at 15:02

## 2018-11-30 VITALS — DIASTOLIC BLOOD PRESSURE: 98 MMHG | SYSTOLIC BLOOD PRESSURE: 154 MMHG

## 2018-11-30 VITALS — DIASTOLIC BLOOD PRESSURE: 90 MMHG | SYSTOLIC BLOOD PRESSURE: 155 MMHG

## 2018-11-30 VITALS — SYSTOLIC BLOOD PRESSURE: 140 MMHG | DIASTOLIC BLOOD PRESSURE: 88 MMHG

## 2018-11-30 VITALS — DIASTOLIC BLOOD PRESSURE: 94 MMHG | SYSTOLIC BLOOD PRESSURE: 172 MMHG

## 2018-11-30 VITALS — DIASTOLIC BLOOD PRESSURE: 89 MMHG | SYSTOLIC BLOOD PRESSURE: 148 MMHG

## 2018-11-30 VITALS — SYSTOLIC BLOOD PRESSURE: 161 MMHG | DIASTOLIC BLOOD PRESSURE: 85 MMHG

## 2018-11-30 VITALS — SYSTOLIC BLOOD PRESSURE: 156 MMHG | DIASTOLIC BLOOD PRESSURE: 80 MMHG

## 2018-11-30 LAB
BASOPHILS NFR BLD AUTO: 0.9 % (ref 0–5)
BUN SERPL-MCNC: 10 MG/DL (ref 7–18)
CHLORIDE SERPL-SCNC: 104 MMOL/L (ref 101–111)
CO2 SERPL-SCNC: 28 MMOL/L (ref 21–32)
CREAT SERPL-MCNC: 0.8 MG/DL (ref 0.5–1.5)
EOSINOPHIL NFR BLD AUTO: 2 % (ref 0–8)
ERYTHROCYTE [DISTWIDTH] IN BLOOD BY AUTOMATED COUNT: 13.9 % (ref 11–15.5)
GFR SERPL CREATININE-BSD FRML MDRD: 117 ML/MIN (ref 60–?)
GLUCOSE SERPL-MCNC: 99 MG/DL (ref 70–105)
HCT VFR BLD AUTO: 24.5 % (ref 42–54)
LYMPHOCYTES NFR SPEC AUTO: 20.2 % (ref 21–51)
MCH RBC QN AUTO: 25.7 PG (ref 27–33)
MCHC RBC AUTO-ENTMCNC: 32.9 G/DL (ref 32–36)
MCV RBC AUTO: 78.1 FL (ref 79–99)
MONOCYTES NFR BLD AUTO: 6.7 % (ref 3–13)
NEUTROPHILS NFR BLD AUTO: 70.2 % (ref 40–77)
NRBC BLD MANUAL-RTO: 0 % (ref 0–0.19)
PLATELET # BLD AUTO: 427 K/UL (ref 130–400)
POTASSIUM SERPL-SCNC: 3.6 MMOL/L (ref 3.5–5.1)
RBC # BLD AUTO: 3.13 MIL/UL (ref 4.5–6.2)
SODIUM SERPL-SCNC: 141 MMOL/L (ref 136–145)
WBC # BLD AUTO: 9.5 K/UL (ref 4.8–10.8)

## 2018-11-30 RX ADMIN — SODIUM CHLORIDE SCH MLS/HR: 9 INJECTION, SOLUTION INTRAVENOUS at 13:20

## 2018-11-30 RX ADMIN — SODIUM CHLORIDE SCH MLS/HR: 9 INJECTION, SOLUTION INTRAVENOUS at 05:32

## 2018-11-30 RX ADMIN — HYDROMORPHONE HYDROCHLORIDE PRN MG: 1 INJECTION, SOLUTION INTRAMUSCULAR; INTRAVENOUS; SUBCUTANEOUS at 14:30

## 2018-11-30 RX ADMIN — FAMOTIDINE SCH MG: 10 INJECTION INTRAVENOUS at 09:44

## 2018-11-30 RX ADMIN — METFORMIN HYDROCHLORIDE SCH MG: 850 TABLET, FILM COATED ORAL at 09:46

## 2018-11-30 RX ADMIN — SODIUM CHLORIDE SCH UNIT: 9 INJECTION, SOLUTION INTRAVENOUS at 11:30

## 2018-11-30 RX ADMIN — DOCUSATE SODIUM SCH MG: 100 TABLET, FILM COATED ORAL at 09:00

## 2018-11-30 RX ADMIN — METFORMIN HYDROCHLORIDE SCH MG: 850 TABLET, FILM COATED ORAL at 17:54

## 2018-11-30 RX ADMIN — INSULIN GLARGINE SCH UNITS: 100 INJECTION, SOLUTION SUBCUTANEOUS at 06:35

## 2018-11-30 RX ADMIN — LINAGLIPTIN SCH MG: 5 TABLET, FILM COATED ORAL at 09:45

## 2018-11-30 RX ADMIN — PIPERACILLIN SODIUM AND TAZOBACTAM SODIUM SCH MLS/HR: .375; 3 INJECTION, POWDER, LYOPHILIZED, FOR SOLUTION INTRAVENOUS at 05:32

## 2018-11-30 RX ADMIN — APIXABAN SCH MG: 5 TABLET, FILM COATED ORAL at 09:45

## 2018-11-30 RX ADMIN — SODIUM CHLORIDE SCH UNIT: 9 INJECTION, SOLUTION INTRAVENOUS at 06:43

## 2018-11-30 RX ADMIN — LISINOPRIL SCH MG: 20 TABLET ORAL at 09:45

## 2018-11-30 RX ADMIN — GLIMEPIRIDE SCH MG: 2 TABLET ORAL at 09:46

## 2018-11-30 RX ADMIN — SODIUM CHLORIDE SCH UNIT: 9 INJECTION, SOLUTION INTRAVENOUS at 16:30

## 2018-11-30 RX ADMIN — METFORMIN HYDROCHLORIDE SCH MG: 850 TABLET, FILM COATED ORAL at 11:59

## 2018-11-30 RX ADMIN — PIPERACILLIN SODIUM AND TAZOBACTAM SODIUM SCH MLS/HR: .375; 3 INJECTION, POWDER, LYOPHILIZED, FOR SOLUTION INTRAVENOUS at 13:20

## 2018-12-21 ENCOUNTER — HOSPITAL ENCOUNTER (INPATIENT)
Dept: HOSPITAL 90 - EDH | Age: 33
LOS: 2 days | Discharge: HOME | DRG: 565 | End: 2018-12-23
Attending: INTERNAL MEDICINE | Admitting: INTERNAL MEDICINE
Payer: COMMERCIAL

## 2018-12-21 VITALS — HEIGHT: 74 IN | WEIGHT: 314.3 LBS | BODY MASS INDEX: 40.33 KG/M2

## 2018-12-21 DIAGNOSIS — Z83.3: ICD-10-CM

## 2018-12-21 DIAGNOSIS — Z79.4: ICD-10-CM

## 2018-12-21 DIAGNOSIS — F17.200: ICD-10-CM

## 2018-12-21 DIAGNOSIS — Z82.5: ICD-10-CM
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
## 2018-12-21 DIAGNOSIS — T87.89: Primary | ICD-10-CM

## 2018-12-21 DIAGNOSIS — E66.9: ICD-10-CM

## 2018-12-21 DIAGNOSIS — Z89.431: ICD-10-CM

## 2018-12-21 DIAGNOSIS — J06.9: ICD-10-CM

## 2018-12-21 DIAGNOSIS — I11.9: ICD-10-CM

## 2018-12-21 DIAGNOSIS — E78.5: ICD-10-CM

## 2018-12-21 DIAGNOSIS — E11.65: ICD-10-CM

## 2018-12-21 DIAGNOSIS — Z82.49: ICD-10-CM
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
## 2018-12-21 DIAGNOSIS — Z88.8: ICD-10-CM

## 2018-12-21 DIAGNOSIS — E11.69: ICD-10-CM

## 2018-12-21 DIAGNOSIS — M86.9: ICD-10-CM

## 2018-12-21 LAB
ALBUMIN SERPL-MCNC: 3.2 G/DL (ref 3.5–5)
ALT SERPL-CCNC: 23 U/L (ref 12–78)
AST SERPL-CCNC: 15 U/L (ref 10–37)
BASOPHILS NFR BLD AUTO: 0.5 % (ref 0–5)
BILIRUB SERPL-MCNC: 0.3 MG/DL (ref 0.2–1)
BUN SERPL-MCNC: 18 MG/DL (ref 7–18)
CHLORIDE SERPL-SCNC: 104 MMOL/L (ref 101–111)
CO2 SERPL-SCNC: 28 MMOL/L (ref 21–32)
CREAT SERPL-MCNC: 1 MG/DL (ref 0.5–1.5)
EOSINOPHIL NFR BLD AUTO: 2.6 % (ref 0–8)
ERYTHROCYTE [DISTWIDTH] IN BLOOD BY AUTOMATED COUNT: 15.1 % (ref 11–15.5)
ERYTHROCYTE [SEDIMENTATION RATE] IN BLOOD BY WESTERGREN METHOD: 63 MM/HR (ref 0–15)
GFR SERPL CREATININE-BSD FRML MDRD: 90 ML/MIN (ref 60–?)
GLUCOSE SERPL-MCNC: 172 MG/DL (ref 70–105)
HCT VFR BLD AUTO: 32.3 % (ref 42–54)
LYMPHOCYTES NFR SPEC AUTO: 27.2 % (ref 21–51)
MCH RBC QN AUTO: 25 PG (ref 27–33)
MCHC RBC AUTO-ENTMCNC: 32.3 G/DL (ref 32–36)
MCV RBC AUTO: 77.3 FL (ref 79–99)
MONOCYTES NFR BLD AUTO: 7.7 % (ref 3–13)
NEUTROPHILS NFR BLD AUTO: 62 % (ref 40–77)
NRBC BLD MANUAL-RTO: 0 % (ref 0–0.19)
PLATELET # BLD AUTO: 261 K/UL (ref 130–400)
POTASSIUM SERPL-SCNC: 3.9 MMOL/L (ref 3.5–5.1)
PROT SERPL-MCNC: 7.7 G/DL (ref 6–8.3)
RBC # BLD AUTO: 4.18 MIL/UL (ref 4.5–6.2)
RBC MORPH BLD: (no result)
SODIUM SERPL-SCNC: 139 MMOL/L (ref 136–145)
WBC # BLD AUTO: 7.9 K/UL (ref 4.8–10.8)

## 2018-12-21 PROCEDURE — 82948 REAGENT STRIP/BLOOD GLUCOSE: CPT

## 2018-12-21 PROCEDURE — 85651 RBC SED RATE NONAUTOMATED: CPT

## 2018-12-21 PROCEDURE — 73630 X-RAY EXAM OF FOOT: CPT

## 2018-12-21 PROCEDURE — 80053 COMPREHEN METABOLIC PANEL: CPT

## 2018-12-21 PROCEDURE — 80048 BASIC METABOLIC PNL TOTAL CA: CPT

## 2018-12-21 PROCEDURE — 36415 COLL VENOUS BLD VENIPUNCTURE: CPT

## 2018-12-21 PROCEDURE — 87040 BLOOD CULTURE FOR BACTERIA: CPT

## 2018-12-21 PROCEDURE — 85025 COMPLETE CBC W/AUTO DIFF WBC: CPT

## 2018-12-21 PROCEDURE — 85027 COMPLETE CBC AUTOMATED: CPT

## 2018-12-21 PROCEDURE — 73718 MRI LOWER EXTREMITY W/O DYE: CPT

## 2018-12-21 RX ADMIN — PIPERACILLIN SODIUM AND TAZOBACTAM SODIUM SCH MLS/HR: .375; 3 INJECTION, POWDER, LYOPHILIZED, FOR SOLUTION INTRAVENOUS at 21:00

## 2018-12-21 RX ADMIN — FAMOTIDINE SCH MG: 10 INJECTION INTRAVENOUS at 21:00

## 2018-12-21 RX ADMIN — ENOXAPARIN SODIUM SCH MG: 40 INJECTION SUBCUTANEOUS at 21:00

## 2018-12-21 RX ADMIN — ATORVASTATIN CALCIUM SCH MG: 40 TABLET, FILM COATED ORAL at 21:00

## 2018-12-22 VITALS — DIASTOLIC BLOOD PRESSURE: 99 MMHG | SYSTOLIC BLOOD PRESSURE: 152 MMHG

## 2018-12-22 VITALS — DIASTOLIC BLOOD PRESSURE: 98 MMHG | SYSTOLIC BLOOD PRESSURE: 171 MMHG

## 2018-12-22 VITALS — DIASTOLIC BLOOD PRESSURE: 100 MMHG | SYSTOLIC BLOOD PRESSURE: 161 MMHG

## 2018-12-22 VITALS — SYSTOLIC BLOOD PRESSURE: 164 MMHG | DIASTOLIC BLOOD PRESSURE: 97 MMHG

## 2018-12-22 VITALS — SYSTOLIC BLOOD PRESSURE: 167 MMHG | DIASTOLIC BLOOD PRESSURE: 94 MMHG

## 2018-12-22 VITALS — SYSTOLIC BLOOD PRESSURE: 165 MMHG | DIASTOLIC BLOOD PRESSURE: 102 MMHG

## 2018-12-22 LAB
BASOPHILS NFR BLD AUTO: 0.6 % (ref 0–5)
BUN SERPL-MCNC: 15 MG/DL (ref 7–18)
CHLORIDE SERPL-SCNC: 105 MMOL/L (ref 101–111)
CO2 SERPL-SCNC: 27 MMOL/L (ref 21–32)
CREAT SERPL-MCNC: 0.9 MG/DL (ref 0.5–1.5)
EOSINOPHIL NFR BLD AUTO: 3.2 % (ref 0–8)
ERYTHROCYTE [DISTWIDTH] IN BLOOD BY AUTOMATED COUNT: 14.4 % (ref 11–15.5)
GFR SERPL CREATININE-BSD FRML MDRD: 102 ML/MIN (ref 60–?)
GLUCOSE SERPL-MCNC: 261 MG/DL (ref 70–105)
HCT VFR BLD AUTO: 29.4 % (ref 42–54)
LYMPHOCYTES NFR SPEC AUTO: 33.1 % (ref 21–51)
MCH RBC QN AUTO: 25.5 PG (ref 27–33)
MCHC RBC AUTO-ENTMCNC: 33 G/DL (ref 32–36)
MCV RBC AUTO: 77.3 FL (ref 79–99)
MONOCYTES NFR BLD AUTO: 7.6 % (ref 3–13)
NEUTROPHILS NFR BLD AUTO: 55.5 % (ref 40–77)
NRBC BLD MANUAL-RTO: 0 % (ref 0–0.19)
PLATELET # BLD AUTO: 244 K/UL (ref 130–400)
POTASSIUM SERPL-SCNC: 4 MMOL/L (ref 3.5–5.1)
RBC # BLD AUTO: 3.81 MIL/UL (ref 4.5–6.2)
SODIUM SERPL-SCNC: 139 MMOL/L (ref 136–145)
WBC # BLD AUTO: 7.2 K/UL (ref 4.8–10.8)

## 2018-12-22 RX ADMIN — SODIUM CHLORIDE SCH UNIT: 9 INJECTION, SOLUTION INTRAVENOUS at 21:28

## 2018-12-22 RX ADMIN — MORPHINE SULFATE PRN MG: 4 INJECTION, SOLUTION INTRAMUSCULAR; INTRAVENOUS at 18:35

## 2018-12-22 RX ADMIN — LISINOPRIL SCH MG: 20 TABLET ORAL at 09:52

## 2018-12-22 RX ADMIN — SODIUM CHLORIDE SCH UNIT: 9 INJECTION, SOLUTION INTRAVENOUS at 06:11

## 2018-12-22 RX ADMIN — ENOXAPARIN SODIUM SCH MG: 40 INJECTION SUBCUTANEOUS at 09:00

## 2018-12-22 RX ADMIN — SODIUM CHLORIDE SCH MLS/HR: 9 INJECTION, SOLUTION INTRAVENOUS at 14:30

## 2018-12-22 RX ADMIN — SODIUM CHLORIDE SCH MLS/HR: 0.9 INJECTION, SOLUTION INTRAVENOUS at 06:50

## 2018-12-22 RX ADMIN — PIPERACILLIN SODIUM AND TAZOBACTAM SODIUM SCH MLS/HR: .375; 3 INJECTION, POWDER, LYOPHILIZED, FOR SOLUTION INTRAVENOUS at 21:27

## 2018-12-22 RX ADMIN — FAMOTIDINE SCH MG: 10 INJECTION INTRAVENOUS at 09:52

## 2018-12-22 RX ADMIN — PIPERACILLIN SODIUM AND TAZOBACTAM SODIUM SCH MLS/HR: .375; 3 INJECTION, POWDER, LYOPHILIZED, FOR SOLUTION INTRAVENOUS at 04:40

## 2018-12-22 RX ADMIN — SODIUM CHLORIDE SCH MLS/HR: 9 INJECTION, SOLUTION INTRAVENOUS at 07:40

## 2018-12-22 RX ADMIN — MORPHINE SULFATE PRN MG: 4 INJECTION, SOLUTION INTRAMUSCULAR; INTRAVENOUS at 13:16

## 2018-12-22 RX ADMIN — SODIUM CHLORIDE SCH MLS/HR: 0.9 INJECTION, SOLUTION INTRAVENOUS at 16:50

## 2018-12-22 RX ADMIN — SODIUM CHLORIDE SCH UNIT: 9 INJECTION, SOLUTION INTRAVENOUS at 11:30

## 2018-12-22 RX ADMIN — SODIUM CHLORIDE SCH MLS/HR: 9 INJECTION, SOLUTION INTRAVENOUS at 22:16

## 2018-12-22 RX ADMIN — PIPERACILLIN SODIUM AND TAZOBACTAM SODIUM SCH MLS/HR: .375; 3 INJECTION, POWDER, LYOPHILIZED, FOR SOLUTION INTRAVENOUS at 13:00

## 2018-12-22 RX ADMIN — SODIUM CHLORIDE SCH UNIT: 9 INJECTION, SOLUTION INTRAVENOUS at 16:30

## 2018-12-22 RX ADMIN — SODIUM CHLORIDE SCH MLS/HR: 0.9 INJECTION, SOLUTION INTRAVENOUS at 02:43

## 2018-12-22 RX ADMIN — FAMOTIDINE SCH MG: 10 INJECTION INTRAVENOUS at 19:54

## 2018-12-22 RX ADMIN — ENOXAPARIN SODIUM SCH MG: 40 INJECTION SUBCUTANEOUS at 19:56

## 2018-12-22 RX ADMIN — ATORVASTATIN CALCIUM SCH MG: 40 TABLET, FILM COATED ORAL at 19:55

## 2018-12-23 VITALS — SYSTOLIC BLOOD PRESSURE: 152 MMHG | DIASTOLIC BLOOD PRESSURE: 94 MMHG

## 2018-12-23 VITALS — SYSTOLIC BLOOD PRESSURE: 144 MMHG | DIASTOLIC BLOOD PRESSURE: 94 MMHG

## 2018-12-23 VITALS — DIASTOLIC BLOOD PRESSURE: 85 MMHG | SYSTOLIC BLOOD PRESSURE: 123 MMHG

## 2018-12-23 VITALS — DIASTOLIC BLOOD PRESSURE: 77 MMHG | SYSTOLIC BLOOD PRESSURE: 135 MMHG

## 2018-12-23 LAB
BUN SERPL-MCNC: 13 MG/DL (ref 7–18)
CHLORIDE SERPL-SCNC: 104 MMOL/L (ref 101–111)
CO2 SERPL-SCNC: 27 MMOL/L (ref 21–32)
CREAT SERPL-MCNC: 0.8 MG/DL (ref 0.5–1.5)
ERYTHROCYTE [DISTWIDTH] IN BLOOD BY AUTOMATED COUNT: 15 % (ref 11–15.5)
GFR SERPL CREATININE-BSD FRML MDRD: 117 ML/MIN (ref 60–?)
GLUCOSE SERPL-MCNC: 207 MG/DL (ref 70–105)
HCT VFR BLD AUTO: 31 % (ref 42–54)
MCH RBC QN AUTO: 24.8 PG (ref 27–33)
MCHC RBC AUTO-ENTMCNC: 32.2 G/DL (ref 32–36)
MCV RBC AUTO: 76.8 FL (ref 79–99)
NRBC BLD MANUAL-RTO: 0.1 % (ref 0–0.19)
PLATELET # BLD AUTO: 250 K/UL (ref 130–400)
POTASSIUM SERPL-SCNC: 3.8 MMOL/L (ref 3.5–5.1)
RBC # BLD AUTO: 4.04 MIL/UL (ref 4.5–6.2)
SODIUM SERPL-SCNC: 140 MMOL/L (ref 136–145)
WBC # BLD AUTO: 8 K/UL (ref 4.8–10.8)

## 2018-12-23 RX ADMIN — PIPERACILLIN SODIUM AND TAZOBACTAM SODIUM SCH MLS/HR: .375; 3 INJECTION, POWDER, LYOPHILIZED, FOR SOLUTION INTRAVENOUS at 04:48

## 2018-12-23 RX ADMIN — FAMOTIDINE SCH MG: 10 INJECTION INTRAVENOUS at 08:55

## 2018-12-23 RX ADMIN — ENOXAPARIN SODIUM SCH MG: 40 INJECTION SUBCUTANEOUS at 08:55

## 2018-12-23 RX ADMIN — LISINOPRIL SCH MG: 20 TABLET ORAL at 08:55

## 2018-12-23 RX ADMIN — SODIUM CHLORIDE SCH MLS/HR: 0.9 INJECTION, SOLUTION INTRAVENOUS at 02:02

## 2018-12-23 RX ADMIN — SODIUM CHLORIDE SCH UNIT: 9 INJECTION, SOLUTION INTRAVENOUS at 06:34

## 2018-12-23 RX ADMIN — SODIUM CHLORIDE SCH MLS/HR: 9 INJECTION, SOLUTION INTRAVENOUS at 06:35

## 2019-02-06 ENCOUNTER — HOSPITAL ENCOUNTER (EMERGENCY)
Dept: HOSPITAL 90 - EDH | Age: 34
LOS: 1 days | Discharge: HOME | End: 2019-02-07
Payer: COMMERCIAL

## 2019-02-06 DIAGNOSIS — M79.604: ICD-10-CM

## 2019-02-06 DIAGNOSIS — Z88.2: ICD-10-CM

## 2019-02-06 DIAGNOSIS — E11.9: ICD-10-CM

## 2019-02-06 DIAGNOSIS — Z98.890: ICD-10-CM

## 2019-02-06 DIAGNOSIS — Z86.718: ICD-10-CM

## 2019-02-06 DIAGNOSIS — M79.671: Primary | ICD-10-CM

## 2019-02-06 DIAGNOSIS — Z88.1: ICD-10-CM

## 2019-02-06 DIAGNOSIS — E78.5: ICD-10-CM

## 2019-02-06 DIAGNOSIS — Z79.4: ICD-10-CM

## 2019-02-06 DIAGNOSIS — I10: ICD-10-CM

## 2019-02-06 PROCEDURE — 99283 EMERGENCY DEPT VISIT LOW MDM: CPT

## 2019-02-06 PROCEDURE — 96372 THER/PROPH/DIAG INJ SC/IM: CPT

## 2019-02-09 ENCOUNTER — HOSPITAL ENCOUNTER (EMERGENCY)
Dept: HOSPITAL 90 - EDH | Age: 34
Discharge: HOME | End: 2019-02-09
Payer: COMMERCIAL

## 2019-02-09 DIAGNOSIS — Z88.1: ICD-10-CM

## 2019-02-09 DIAGNOSIS — S93.401A: Primary | ICD-10-CM

## 2019-02-09 DIAGNOSIS — Z88.2: ICD-10-CM
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
## 2019-02-09 DIAGNOSIS — Z86.718: ICD-10-CM
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
## 2019-02-09 DIAGNOSIS — I10: ICD-10-CM
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
## 2019-02-09 DIAGNOSIS — Z79.4: ICD-10-CM

## 2019-02-09 DIAGNOSIS — E78.5: ICD-10-CM
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
## 2019-02-09 DIAGNOSIS — E11.9: ICD-10-CM

## 2019-02-09 PROCEDURE — 82948 REAGENT STRIP/BLOOD GLUCOSE: CPT

## 2019-10-25 ENCOUNTER — HOSPITAL ENCOUNTER (OUTPATIENT)
Dept: HOSPITAL 90 - SHCH | Age: 34
Discharge: HOME | End: 2019-10-25
Attending: INTERNAL MEDICINE
Payer: MEDICAID

## 2019-10-25 DIAGNOSIS — I10: Primary | ICD-10-CM

## 2019-10-25 PROCEDURE — 93306 TTE W/DOPPLER COMPLETE: CPT

## 2020-03-09 ENCOUNTER — HOSPITAL ENCOUNTER (EMERGENCY)
Dept: HOSPITAL 90 - EDH | Age: 35
Discharge: HOME | End: 2020-03-09
Payer: MEDICAID

## 2020-03-09 DIAGNOSIS — Z88.8: ICD-10-CM

## 2020-03-09 DIAGNOSIS — M79.661: Primary | ICD-10-CM

## 2020-03-09 DIAGNOSIS — Z86.718: ICD-10-CM

## 2020-03-09 DIAGNOSIS — Z90.49: ICD-10-CM

## 2020-03-09 DIAGNOSIS — E11.9: ICD-10-CM

## 2020-03-09 DIAGNOSIS — E78.5: ICD-10-CM

## 2020-03-09 DIAGNOSIS — I10: ICD-10-CM

## 2020-03-09 DIAGNOSIS — Z88.1: ICD-10-CM

## 2020-03-09 LAB
ALBUMIN SERPL-MCNC: 3.4 G/DL (ref 3.5–5)
ALT SERPL-CCNC: 32 U/L (ref 12–78)
AST SERPL-CCNC: 24 U/L (ref 10–37)
BASOPHILS NFR BLD AUTO: 0.4 % (ref 0–5)
BILIRUB SERPL-MCNC: 0.3 MG/DL (ref 0.2–1)
BUN SERPL-MCNC: 26 MG/DL (ref 7–18)
CHLORIDE SERPL-SCNC: 101 MMOL/L (ref 101–111)
CO2 SERPL-SCNC: 25 MMOL/L (ref 21–32)
CREAT SERPL-MCNC: 1.2 MG/DL (ref 0.5–1.5)
EOSINOPHIL NFR BLD AUTO: 4.5 % (ref 0–8)
ERYTHROCYTE [DISTWIDTH] IN BLOOD BY AUTOMATED COUNT: 13.3 % (ref 11–15.5)
GFR SERPL CREATININE-BSD FRML MDRD: 73 ML/MIN (ref 60–?)
GLUCOSE SERPL-MCNC: 288 MG/DL (ref 70–105)
HCT VFR BLD AUTO: 34.1 % (ref 42–54)
LYMPHOCYTES NFR SPEC AUTO: 29.1 % (ref 21–51)
MCH RBC QN AUTO: 27 PG (ref 27–33)
MCHC RBC AUTO-ENTMCNC: 32.6 G/DL (ref 32–36)
MCV RBC AUTO: 83 FL (ref 79–99)
MONOCYTES NFR BLD AUTO: 7.7 % (ref 3–13)
NEUTROPHILS NFR BLD AUTO: 57.3 % (ref 40–77)
NRBC BLD MANUAL-RTO: 0 % (ref 0–0.19)
PLATELET # BLD AUTO: 244 K/UL (ref 130–400)
POTASSIUM SERPL-SCNC: 4.4 MMOL/L (ref 3.5–5.1)
PROT SERPL-MCNC: 7.7 G/DL (ref 6–8.3)
RBC # BLD AUTO: 4.11 MIL/UL (ref 4.5–6.2)
SODIUM SERPL-SCNC: 137 MMOL/L (ref 136–145)
WBC # BLD AUTO: 9.3 K/UL (ref 4.8–10.8)

## 2020-03-09 PROCEDURE — 36415 COLL VENOUS BLD VENIPUNCTURE: CPT

## 2020-03-09 PROCEDURE — 93971 EXTREMITY STUDY: CPT

## 2020-03-09 PROCEDURE — 85025 COMPLETE CBC W/AUTO DIFF WBC: CPT

## 2020-03-09 PROCEDURE — 80053 COMPREHEN METABOLIC PANEL: CPT

## 2020-03-09 PROCEDURE — 83605 ASSAY OF LACTIC ACID: CPT

## 2020-08-17 ENCOUNTER — HOSPITAL ENCOUNTER (OUTPATIENT)
Dept: HOSPITAL 90 - SHCH | Age: 35
Discharge: HOME | End: 2020-08-17
Attending: INTERNAL MEDICINE
Payer: MEDICAID

## 2020-08-17 DIAGNOSIS — I80.201: Primary | ICD-10-CM

## 2020-08-17 PROCEDURE — 93922 UPR/L XTREMITY ART 2 LEVELS: CPT

## 2020-09-10 ENCOUNTER — HOSPITAL ENCOUNTER (INPATIENT)
Dept: HOSPITAL 90 - EDH | Age: 35
LOS: 5 days | Discharge: HOME HEALTH | DRG: 710 | End: 2020-09-15
Attending: INTERNAL MEDICINE | Admitting: INTERNAL MEDICINE
Payer: MEDICAID

## 2020-09-10 VITALS — SYSTOLIC BLOOD PRESSURE: 118 MMHG | DIASTOLIC BLOOD PRESSURE: 63 MMHG

## 2020-09-10 VITALS — SYSTOLIC BLOOD PRESSURE: 115 MMHG | DIASTOLIC BLOOD PRESSURE: 62 MMHG

## 2020-09-10 VITALS — SYSTOLIC BLOOD PRESSURE: 142 MMHG | DIASTOLIC BLOOD PRESSURE: 80 MMHG

## 2020-09-10 VITALS — DIASTOLIC BLOOD PRESSURE: 76 MMHG | SYSTOLIC BLOOD PRESSURE: 128 MMHG

## 2020-09-10 VITALS — WEIGHT: 315 LBS | BODY MASS INDEX: 40.43 KG/M2 | HEIGHT: 74 IN

## 2020-09-10 DIAGNOSIS — Z83.3: ICD-10-CM

## 2020-09-10 DIAGNOSIS — J45.909: ICD-10-CM

## 2020-09-10 DIAGNOSIS — Z86.718: ICD-10-CM

## 2020-09-10 DIAGNOSIS — Z79.4: ICD-10-CM

## 2020-09-10 DIAGNOSIS — L02.611: ICD-10-CM

## 2020-09-10 DIAGNOSIS — L97.519: ICD-10-CM

## 2020-09-10 DIAGNOSIS — Z86.14: ICD-10-CM

## 2020-09-10 DIAGNOSIS — I10: ICD-10-CM

## 2020-09-10 DIAGNOSIS — M86.9: ICD-10-CM

## 2020-09-10 DIAGNOSIS — E66.01: ICD-10-CM

## 2020-09-10 DIAGNOSIS — Z82.5: ICD-10-CM

## 2020-09-10 DIAGNOSIS — Z90.49: ICD-10-CM

## 2020-09-10 DIAGNOSIS — I42.9: ICD-10-CM

## 2020-09-10 DIAGNOSIS — L03.115: ICD-10-CM

## 2020-09-10 DIAGNOSIS — Z82.49: ICD-10-CM

## 2020-09-10 DIAGNOSIS — E11.65: ICD-10-CM

## 2020-09-10 DIAGNOSIS — E11.621: ICD-10-CM

## 2020-09-10 DIAGNOSIS — E11.69: ICD-10-CM

## 2020-09-10 DIAGNOSIS — D64.9: ICD-10-CM

## 2020-09-10 DIAGNOSIS — E78.5: ICD-10-CM

## 2020-09-10 DIAGNOSIS — A41.9: Primary | ICD-10-CM

## 2020-09-10 DIAGNOSIS — N17.9: ICD-10-CM

## 2020-09-10 DIAGNOSIS — Z87.891: ICD-10-CM

## 2020-09-10 LAB
ALBUMIN SERPL-MCNC: 3.3 G/DL (ref 3.5–5)
ALT SERPL-CCNC: 21 U/L (ref 12–78)
APTT PPP: 31.7 SEC (ref 26.3–35.5)
AST SERPL-CCNC: 17 U/L (ref 10–37)
BASOPHILS NFR BLD AUTO: 0.4 % (ref 0–5)
BILIRUB SERPL-MCNC: 0.5 MG/DL (ref 0.2–1)
BUN SERPL-MCNC: 66 MG/DL (ref 7–18)
CHLORIDE SERPL-SCNC: 97 MMOL/L (ref 101–111)
CK SERPL-CCNC: 163 U/L (ref 21–232)
CO2 SERPL-SCNC: 24 MMOL/L (ref 21–32)
CREAT SERPL-MCNC: 2.7 MG/DL (ref 0.5–1.5)
CREAT UR-MCNC: 89 MG/DL (ref 30–135)
EOSINOPHIL NFR BLD AUTO: 1.2 % (ref 0–8)
ERYTHROCYTE [DISTWIDTH] IN BLOOD BY AUTOMATED COUNT: 13.8 % (ref 11–15.5)
GFR SERPL CREATININE-BSD FRML MDRD: 29 ML/MIN (ref 60–?)
GLUCOSE SERPL-MCNC: 303 MG/DL (ref 70–105)
GLUCOSE UR STRIP-MCNC: >=1000 MG/DL
HCT VFR BLD AUTO: 31.3 % (ref 42–54)
INR PPP: 1.02 (ref 0.85–1.15)
LYMPHOCYTES NFR SPEC AUTO: 14.9 % (ref 21–51)
MCH RBC QN AUTO: 26.4 PG (ref 27–33)
MCHC RBC AUTO-ENTMCNC: 31.9 G/DL (ref 32–36)
MCV RBC AUTO: 82.6 FL (ref 79–99)
MONOCYTES NFR BLD AUTO: 9.4 % (ref 3–13)
MYOGLOBIN SERPL-MCNC: 167 NG/ML (ref 10–92)
NEUTROPHILS NFR BLD AUTO: 72.5 % (ref 40–77)
NRBC BLD MANUAL-RTO: 0 % (ref 0–0.19)
PH UR STRIP: 5 [PH] (ref 5–8)
PLATELET # BLD AUTO: 405 K/UL (ref 130–400)
POTASSIUM SERPL-SCNC: 4.6 MMOL/L (ref 3.5–5.1)
PROT SERPL-MCNC: 8.7 G/DL (ref 6–8.3)
PROTHROMBIN TIME: 11 SEC (ref 9.6–11.6)
RBC # BLD AUTO: 3.79 MIL/UL (ref 4.5–6.2)
RBC #/AREA URNS HPF: (no result) /HPF (ref 0–1)
SODIUM SERPL-SCNC: 131 MMOL/L (ref 136–145)
SODIUM UR-SCNC: 49 MMOL/L (ref 40–220)
SP GR UR STRIP: 1.02 (ref 1–1.03)
TROPONIN I SERPL-MCNC: < 0.04 NG/ML (ref 0–0.06)
UROBILINOGEN UR STRIP-MCNC: 1 MG/DL (ref 0.2–1)
WBC # BLD AUTO: 15.9 K/UL (ref 4.8–10.8)
WBC #/AREA URNS HPF: (no result) /HPF (ref 0–1)

## 2020-09-10 PROCEDURE — 82948 REAGENT STRIP/BLOOD GLUCOSE: CPT

## 2020-09-10 PROCEDURE — 84145 PROCALCITONIN (PCT): CPT

## 2020-09-10 PROCEDURE — 84540 ASSAY OF URINE/UREA-N: CPT

## 2020-09-10 PROCEDURE — 85025 COMPLETE CBC W/AUTO DIFF WBC: CPT

## 2020-09-10 PROCEDURE — 97039 UNLISTED MODALITY: CPT

## 2020-09-10 PROCEDURE — 87070 CULTURE OTHR SPECIMN AEROBIC: CPT

## 2020-09-10 PROCEDURE — 87186 SC STD MICRODIL/AGAR DIL: CPT

## 2020-09-10 PROCEDURE — 71045 X-RAY EXAM CHEST 1 VIEW: CPT

## 2020-09-10 PROCEDURE — 76770 US EXAM ABDO BACK WALL COMP: CPT

## 2020-09-10 PROCEDURE — 85610 PROTHROMBIN TIME: CPT

## 2020-09-10 PROCEDURE — 73630 X-RAY EXAM OF FOOT: CPT

## 2020-09-10 PROCEDURE — 84300 ASSAY OF URINE SODIUM: CPT

## 2020-09-10 PROCEDURE — 93005 ELECTROCARDIOGRAM TRACING: CPT

## 2020-09-10 PROCEDURE — 86140 C-REACTIVE PROTEIN: CPT

## 2020-09-10 PROCEDURE — 82570 ASSAY OF URINE CREATININE: CPT

## 2020-09-10 PROCEDURE — 85027 COMPLETE CBC AUTOMATED: CPT

## 2020-09-10 PROCEDURE — 84550 ASSAY OF BLOOD/URIC ACID: CPT

## 2020-09-10 PROCEDURE — 85730 THROMBOPLASTIN TIME PARTIAL: CPT

## 2020-09-10 PROCEDURE — 36415 COLL VENOUS BLD VENIPUNCTURE: CPT

## 2020-09-10 PROCEDURE — 87076 CULTURE ANAEROBE IDENT EACH: CPT

## 2020-09-10 PROCEDURE — 93925 LOWER EXTREMITY STUDY: CPT

## 2020-09-10 PROCEDURE — 83605 ASSAY OF LACTIC ACID: CPT

## 2020-09-10 PROCEDURE — 87205 SMEAR GRAM STAIN: CPT

## 2020-09-10 PROCEDURE — 83874 ASSAY OF MYOGLOBIN: CPT

## 2020-09-10 PROCEDURE — 87077 CULTURE AEROBIC IDENTIFY: CPT

## 2020-09-10 PROCEDURE — 73718 MRI LOWER EXTREMITY W/O DYE: CPT

## 2020-09-10 PROCEDURE — 87040 BLOOD CULTURE FOR BACTERIA: CPT

## 2020-09-10 PROCEDURE — 85651 RBC SED RATE NONAUTOMATED: CPT

## 2020-09-10 PROCEDURE — 80048 BASIC METABOLIC PNL TOTAL CA: CPT

## 2020-09-10 PROCEDURE — 83735 ASSAY OF MAGNESIUM: CPT

## 2020-09-10 PROCEDURE — 82550 ASSAY OF CK (CPK): CPT

## 2020-09-10 PROCEDURE — 90715 TDAP VACCINE 7 YRS/> IM: CPT

## 2020-09-10 PROCEDURE — 84484 ASSAY OF TROPONIN QUANT: CPT

## 2020-09-10 PROCEDURE — 80053 COMPREHEN METABOLIC PANEL: CPT

## 2020-09-10 PROCEDURE — 81001 URINALYSIS AUTO W/SCOPE: CPT

## 2020-09-10 PROCEDURE — 84100 ASSAY OF PHOSPHORUS: CPT

## 2020-09-10 RX ADMIN — SODIUM CHLORIDE SCH UNIT: 9 INJECTION, SOLUTION INTRAVENOUS at 13:49

## 2020-09-10 RX ADMIN — Medication SCH APPL: at 16:04

## 2020-09-10 RX ADMIN — SODIUM CHLORIDE SCH UNIT: 9 INJECTION, SOLUTION INTRAVENOUS at 16:35

## 2020-09-10 RX ADMIN — HEPARIN SODIUM SCH UNIT: 5000 INJECTION, SOLUTION INTRAVENOUS; SUBCUTANEOUS at 13:50

## 2020-09-10 RX ADMIN — SODIUM CHLORIDE SCH MLS/HR: 0.9 INJECTION, SOLUTION INTRAVENOUS at 20:42

## 2020-09-10 RX ADMIN — AMPICILLIN AND SULBACTAM SCH MLS/HR: 1; .5 INJECTION, POWDER, FOR SOLUTION INTRAVENOUS at 16:57

## 2020-09-10 RX ADMIN — HEPARIN SODIUM SCH UNIT: 5000 INJECTION, SOLUTION INTRAVENOUS; SUBCUTANEOUS at 21:01

## 2020-09-10 RX ADMIN — MORPHINE SULFATE PRN MG: 4 INJECTION, SOLUTION INTRAMUSCULAR; INTRAVENOUS at 20:51

## 2020-09-10 RX ADMIN — MORPHINE SULFATE PRN MG: 4 INJECTION, SOLUTION INTRAMUSCULAR; INTRAVENOUS at 15:31

## 2020-09-10 RX ADMIN — AMPICILLIN AND SULBACTAM SCH MLS/HR: 1; .5 INJECTION, POWDER, FOR SOLUTION INTRAVENOUS at 12:00

## 2020-09-10 RX ADMIN — AMPICILLIN AND SULBACTAM SCH MLS/HR: 1; .5 INJECTION, POWDER, FOR SOLUTION INTRAVENOUS at 23:32

## 2020-09-10 RX ADMIN — Medication SCH APPL: at 14:14

## 2020-09-10 RX ADMIN — FAMOTIDINE SCH MG: 10 INJECTION INTRAVENOUS at 20:42

## 2020-09-10 RX ADMIN — SODIUM CHLORIDE SCH UNIT: 9 INJECTION, SOLUTION INTRAVENOUS at 21:02

## 2020-09-10 RX ADMIN — SODIUM CHLORIDE SCH MLS/HR: 0.9 INJECTION, SOLUTION INTRAVENOUS at 10:05

## 2020-09-10 NOTE — NUR
assessment note

 patient awake, alert, ox3, no sob ,no c/o pain at this time, teach patient plan of care and 
expected outcome, patient verbalizes understanding via teach back, call bell at reach, 
dressing right foot d/i

## 2020-09-10 NOTE — NUR
[facility] CONSULT



PATIENT ASSESSED AS REQUESTED:  [facility] RECOMMENDATIONS SUBMITTED AND REPORT GIVEN TO PATIENT'S 
NURSE.  DR. ANDREW RUGGIERO CONSULTED FOR WOUND CARE.

-------------------------------------------------------------------------------

Addendum: 09/10/20 at 1411 by FAISAL CADE LVN LVN W

-------------------------------------------------------------------------------

Amended: Links added.

## 2020-09-11 VITALS — SYSTOLIC BLOOD PRESSURE: 143 MMHG | DIASTOLIC BLOOD PRESSURE: 83 MMHG

## 2020-09-11 VITALS — DIASTOLIC BLOOD PRESSURE: 65 MMHG | SYSTOLIC BLOOD PRESSURE: 119 MMHG

## 2020-09-11 VITALS — DIASTOLIC BLOOD PRESSURE: 70 MMHG | SYSTOLIC BLOOD PRESSURE: 118 MMHG

## 2020-09-11 VITALS — SYSTOLIC BLOOD PRESSURE: 145 MMHG | DIASTOLIC BLOOD PRESSURE: 79 MMHG

## 2020-09-11 VITALS — DIASTOLIC BLOOD PRESSURE: 81 MMHG | SYSTOLIC BLOOD PRESSURE: 144 MMHG

## 2020-09-11 LAB
BASOPHILS NFR BLD AUTO: 0.3 % (ref 0–5)
BUN SERPL-MCNC: 44 MG/DL (ref 7–18)
CHLORIDE SERPL-SCNC: 102 MMOL/L (ref 101–111)
CO2 SERPL-SCNC: 25 MMOL/L (ref 21–32)
CREAT SERPL-MCNC: 1.6 MG/DL (ref 0.5–1.5)
EOSINOPHIL NFR BLD AUTO: 2.1 % (ref 0–8)
ERYTHROCYTE [DISTWIDTH] IN BLOOD BY AUTOMATED COUNT: 13.8 % (ref 11–15.5)
GFR SERPL CREATININE-BSD FRML MDRD: 52 ML/MIN (ref 60–?)
GLUCOSE SERPL-MCNC: 154 MG/DL (ref 70–105)
HCT VFR BLD AUTO: 28.4 % (ref 42–54)
LYMPHOCYTES NFR SPEC AUTO: 15.9 % (ref 21–51)
MAGNESIUM SERPL-MCNC: 2.2 MG/DL (ref 1.8–2.4)
MCH RBC QN AUTO: 26.6 PG (ref 27–33)
MCHC RBC AUTO-ENTMCNC: 31.7 G/DL (ref 32–36)
MCV RBC AUTO: 84 FL (ref 79–99)
MONOCYTES NFR BLD AUTO: 10.7 % (ref 3–13)
NEUTROPHILS NFR BLD AUTO: 69.2 % (ref 40–77)
NRBC BLD MANUAL-RTO: 0 % (ref 0–0.19)
PHOSPHATE SERPL-MCNC: 3.1 MG/DL (ref 2.5–4.9)
PLATELET # BLD AUTO: 376 K/UL (ref 130–400)
POTASSIUM SERPL-SCNC: 4.7 MMOL/L (ref 3.5–5.1)
RBC # BLD AUTO: 3.38 MIL/UL (ref 4.5–6.2)
SODIUM SERPL-SCNC: 135 MMOL/L (ref 136–145)
URATE SERPL-MCNC: 9.6 MG/DL (ref 2.6–7.2)
WBC # BLD AUTO: 12.6 K/UL (ref 4.8–10.8)

## 2020-09-11 RX ADMIN — Medication SCH APPL: at 07:54

## 2020-09-11 RX ADMIN — SODIUM CHLORIDE SCH UNIT: 9 INJECTION, SOLUTION INTRAVENOUS at 05:56

## 2020-09-11 RX ADMIN — HEPARIN SODIUM SCH UNIT: 5000 INJECTION, SOLUTION INTRAVENOUS; SUBCUTANEOUS at 07:58

## 2020-09-11 RX ADMIN — HEPARIN SODIUM SCH UNIT: 5000 INJECTION, SOLUTION INTRAVENOUS; SUBCUTANEOUS at 19:59

## 2020-09-11 RX ADMIN — AMPICILLIN AND SULBACTAM SCH MLS/HR: 1; .5 INJECTION, POWDER, FOR SOLUTION INTRAVENOUS at 05:30

## 2020-09-11 RX ADMIN — FAMOTIDINE SCH MG: 10 INJECTION INTRAVENOUS at 07:54

## 2020-09-11 RX ADMIN — SODIUM CHLORIDE SCH MLS/HR: 0.9 INJECTION, SOLUTION INTRAVENOUS at 04:14

## 2020-09-11 RX ADMIN — FAMOTIDINE SCH MG: 10 INJECTION INTRAVENOUS at 19:58

## 2020-09-11 RX ADMIN — MORPHINE SULFATE PRN MG: 4 INJECTION, SOLUTION INTRAMUSCULAR; INTRAVENOUS at 11:51

## 2020-09-11 RX ADMIN — SODIUM CHLORIDE SCH UNIT: 9 INJECTION, SOLUTION INTRAVENOUS at 16:30

## 2020-09-11 RX ADMIN — SODIUM CHLORIDE SCH MLS/HR: 0.9 INJECTION, SOLUTION INTRAVENOUS at 23:57

## 2020-09-11 RX ADMIN — AMPICILLIN AND SULBACTAM SCH MLS/HR: 1; .5 INJECTION, POWDER, FOR SOLUTION INTRAVENOUS at 18:47

## 2020-09-11 RX ADMIN — AMPICILLIN AND SULBACTAM SCH MLS/HR: 1; .5 INJECTION, POWDER, FOR SOLUTION INTRAVENOUS at 23:57

## 2020-09-11 RX ADMIN — SODIUM CHLORIDE SCH UNIT: 9 INJECTION, SOLUTION INTRAVENOUS at 21:25

## 2020-09-11 RX ADMIN — INSULIN GLARGINE SCH UNITS: 100 INJECTION, SOLUTION SUBCUTANEOUS at 07:54

## 2020-09-11 RX ADMIN — ASCORBIC ACID, FOLIC ACID, NIACIN, THIAMINE, RIBOFLAVIN, PYRIDOXINE, CYANOCOBALAMIN, PANTOTHENIC ACID, BIOTIN SCH CAP: 100; 150; 6; 1; 20; 5; 10; 1.7; 1.5 CAPSULE, LIQUID FILLED ORAL at 07:55

## 2020-09-11 RX ADMIN — HYDROMORPHONE HYDROCHLORIDE PRN MG: 1 INJECTION, SOLUTION INTRAMUSCULAR; INTRAVENOUS; SUBCUTANEOUS at 20:02

## 2020-09-11 RX ADMIN — HYDROMORPHONE HYDROCHLORIDE PRN MG: 1 INJECTION, SOLUTION INTRAMUSCULAR; INTRAVENOUS; SUBCUTANEOUS at 14:57

## 2020-09-11 RX ADMIN — MORPHINE SULFATE PRN MG: 4 INJECTION, SOLUTION INTRAMUSCULAR; INTRAVENOUS at 07:55

## 2020-09-11 RX ADMIN — HEPARIN SODIUM SCH UNIT: 5000 INJECTION, SOLUTION INTRAVENOUS; SUBCUTANEOUS at 15:03

## 2020-09-11 RX ADMIN — MORPHINE SULFATE PRN MG: 4 INJECTION, SOLUTION INTRAMUSCULAR; INTRAVENOUS at 03:31

## 2020-09-11 RX ADMIN — SODIUM CHLORIDE SCH MLS/HR: 0.9 INJECTION, SOLUTION INTRAVENOUS at 16:41

## 2020-09-11 RX ADMIN — SODIUM CHLORIDE SCH UNIT: 9 INJECTION, SOLUTION INTRAVENOUS at 11:30

## 2020-09-11 RX ADMIN — AMPICILLIN AND SULBACTAM SCH MLS/HR: 1; .5 INJECTION, POWDER, FOR SOLUTION INTRAVENOUS at 12:10

## 2020-09-11 RX ADMIN — AMLODIPINE BESYLATE SCH MG: 5 TABLET ORAL at 07:55

## 2020-09-11 NOTE — NUR
DCP

CM met with pt discussed dc plans. Pt is joseph-independent prior to admission, lives at home 
with father. Pt has a cane, shower chair, verbalized had provider 16hrs/wk but discontinued 
recently. Pt verbalized he will ask to have provider reactivate again as he is going to have 
amputation during this admission and will need assistance at home. Pt feels safe to go back 
home, still drives only if needed. Pt agreeable for placement if necessary, would like to 
discuss once closer to dc and after he has PT to see how far he can do things on his own 
first. Informed ptwill wait for Dr Beck and Dr Quezada's recommendations after surgery. 
DC plan to home vs w/hh or SNF. CM to cont to follow up.

-------------------------------------------------------------------------------

Addendum: 09/11/20 at 1219 by MELY MCKINLEY LVN CM

-------------------------------------------------------------------------------

Amended: Links added.

## 2020-09-12 VITALS — SYSTOLIC BLOOD PRESSURE: 149 MMHG | DIASTOLIC BLOOD PRESSURE: 75 MMHG

## 2020-09-12 VITALS — DIASTOLIC BLOOD PRESSURE: 85 MMHG | SYSTOLIC BLOOD PRESSURE: 154 MMHG

## 2020-09-12 VITALS — DIASTOLIC BLOOD PRESSURE: 80 MMHG | SYSTOLIC BLOOD PRESSURE: 142 MMHG

## 2020-09-12 VITALS — DIASTOLIC BLOOD PRESSURE: 79 MMHG | SYSTOLIC BLOOD PRESSURE: 147 MMHG

## 2020-09-12 VITALS — DIASTOLIC BLOOD PRESSURE: 87 MMHG | SYSTOLIC BLOOD PRESSURE: 165 MMHG

## 2020-09-12 VITALS — DIASTOLIC BLOOD PRESSURE: 86 MMHG | SYSTOLIC BLOOD PRESSURE: 134 MMHG

## 2020-09-12 VITALS — DIASTOLIC BLOOD PRESSURE: 80 MMHG | SYSTOLIC BLOOD PRESSURE: 141 MMHG

## 2020-09-12 VITALS — SYSTOLIC BLOOD PRESSURE: 132 MMHG | DIASTOLIC BLOOD PRESSURE: 63 MMHG

## 2020-09-12 VITALS — DIASTOLIC BLOOD PRESSURE: 68 MMHG | SYSTOLIC BLOOD PRESSURE: 126 MMHG

## 2020-09-12 VITALS — DIASTOLIC BLOOD PRESSURE: 84 MMHG | SYSTOLIC BLOOD PRESSURE: 136 MMHG

## 2020-09-12 VITALS — SYSTOLIC BLOOD PRESSURE: 165 MMHG | DIASTOLIC BLOOD PRESSURE: 80 MMHG

## 2020-09-12 VITALS — DIASTOLIC BLOOD PRESSURE: 74 MMHG | SYSTOLIC BLOOD PRESSURE: 164 MMHG

## 2020-09-12 VITALS — SYSTOLIC BLOOD PRESSURE: 124 MMHG | DIASTOLIC BLOOD PRESSURE: 62 MMHG

## 2020-09-12 VITALS — DIASTOLIC BLOOD PRESSURE: 61 MMHG | SYSTOLIC BLOOD PRESSURE: 137 MMHG

## 2020-09-12 VITALS — DIASTOLIC BLOOD PRESSURE: 63 MMHG | SYSTOLIC BLOOD PRESSURE: 132 MMHG

## 2020-09-12 VITALS — SYSTOLIC BLOOD PRESSURE: 163 MMHG | DIASTOLIC BLOOD PRESSURE: 91 MMHG

## 2020-09-12 VITALS — SYSTOLIC BLOOD PRESSURE: 142 MMHG | DIASTOLIC BLOOD PRESSURE: 66 MMHG

## 2020-09-12 VITALS — DIASTOLIC BLOOD PRESSURE: 91 MMHG | SYSTOLIC BLOOD PRESSURE: 156 MMHG

## 2020-09-12 VITALS — SYSTOLIC BLOOD PRESSURE: 142 MMHG | DIASTOLIC BLOOD PRESSURE: 80 MMHG

## 2020-09-12 VITALS — SYSTOLIC BLOOD PRESSURE: 144 MMHG | DIASTOLIC BLOOD PRESSURE: 83 MMHG

## 2020-09-12 VITALS — DIASTOLIC BLOOD PRESSURE: 79 MMHG | SYSTOLIC BLOOD PRESSURE: 149 MMHG

## 2020-09-12 VITALS — SYSTOLIC BLOOD PRESSURE: 141 MMHG | DIASTOLIC BLOOD PRESSURE: 73 MMHG

## 2020-09-12 VITALS — SYSTOLIC BLOOD PRESSURE: 135 MMHG | DIASTOLIC BLOOD PRESSURE: 80 MMHG

## 2020-09-12 VITALS — DIASTOLIC BLOOD PRESSURE: 83 MMHG | SYSTOLIC BLOOD PRESSURE: 152 MMHG

## 2020-09-12 VITALS — DIASTOLIC BLOOD PRESSURE: 82 MMHG | SYSTOLIC BLOOD PRESSURE: 151 MMHG

## 2020-09-12 LAB
BUN SERPL-MCNC: 30 MG/DL (ref 7–18)
CHLORIDE SERPL-SCNC: 104 MMOL/L (ref 101–111)
CO2 SERPL-SCNC: 26 MMOL/L (ref 21–32)
CREAT SERPL-MCNC: 1.3 MG/DL (ref 0.5–1.5)
ERYTHROCYTE [DISTWIDTH] IN BLOOD BY AUTOMATED COUNT: 13.5 % (ref 11–15.5)
GFR SERPL CREATININE-BSD FRML MDRD: 66 ML/MIN (ref 60–?)
GLUCOSE SERPL-MCNC: 150 MG/DL (ref 70–105)
HCT VFR BLD AUTO: 28 % (ref 42–54)
MCH RBC QN AUTO: 26 PG (ref 27–33)
MCHC RBC AUTO-ENTMCNC: 31.1 G/DL (ref 32–36)
MCV RBC AUTO: 83.8 FL (ref 79–99)
NRBC BLD MANUAL-RTO: 0 % (ref 0–0.19)
PLATELET # BLD AUTO: 394 K/UL (ref 130–400)
POTASSIUM SERPL-SCNC: 4.9 MMOL/L (ref 3.5–5.1)
RBC # BLD AUTO: 3.34 MIL/UL (ref 4.5–6.2)
SODIUM SERPL-SCNC: 137 MMOL/L (ref 136–145)
WBC # BLD AUTO: 10.7 K/UL (ref 4.8–10.8)

## 2020-09-12 PROCEDURE — 0Y6M0ZB DETACHMENT AT RIGHT FOOT, PARTIAL 2ND RAY, OPEN APPROACH: ICD-10-PCS | Performed by: PODIATRIST

## 2020-09-12 RX ADMIN — AMPICILLIN AND SULBACTAM SCH MLS/HR: 1; .5 INJECTION, POWDER, FOR SOLUTION INTRAVENOUS at 12:22

## 2020-09-12 RX ADMIN — SODIUM CHLORIDE SCH UNIT: 9 INJECTION, SOLUTION INTRAVENOUS at 11:30

## 2020-09-12 RX ADMIN — FAMOTIDINE SCH MG: 10 INJECTION INTRAVENOUS at 21:22

## 2020-09-12 RX ADMIN — AMPICILLIN AND SULBACTAM SCH MLS/HR: 1; .5 INJECTION, POWDER, FOR SOLUTION INTRAVENOUS at 18:30

## 2020-09-12 RX ADMIN — INSULIN GLARGINE SCH UNITS: 100 INJECTION, SOLUTION SUBCUTANEOUS at 01:12

## 2020-09-12 RX ADMIN — SODIUM CHLORIDE SCH UNIT: 9 INJECTION, SOLUTION INTRAVENOUS at 16:23

## 2020-09-12 RX ADMIN — SODIUM CHLORIDE SCH UNIT: 9 INJECTION, SOLUTION INTRAVENOUS at 06:13

## 2020-09-12 RX ADMIN — HEPARIN SODIUM SCH UNIT: 5000 INJECTION, SOLUTION INTRAVENOUS; SUBCUTANEOUS at 08:28

## 2020-09-12 RX ADMIN — SODIUM CHLORIDE SCH UNIT: 9 INJECTION, SOLUTION INTRAVENOUS at 21:00

## 2020-09-12 RX ADMIN — SODIUM CHLORIDE SCH MLS/HR: 0.9 INJECTION, SOLUTION INTRAVENOUS at 12:05

## 2020-09-12 RX ADMIN — HYDROMORPHONE HYDROCHLORIDE PRN MG: 1 INJECTION, SOLUTION INTRAMUSCULAR; INTRAVENOUS; SUBCUTANEOUS at 18:30

## 2020-09-12 RX ADMIN — AMLODIPINE BESYLATE SCH MG: 5 TABLET ORAL at 08:28

## 2020-09-12 RX ADMIN — Medication SCH APPL: at 08:28

## 2020-09-12 RX ADMIN — AMPICILLIN AND SULBACTAM SCH MLS/HR: 1; .5 INJECTION, POWDER, FOR SOLUTION INTRAVENOUS at 23:13

## 2020-09-12 RX ADMIN — SODIUM CHLORIDE SCH MLS/HR: 0.9 INJECTION, SOLUTION INTRAVENOUS at 23:23

## 2020-09-12 RX ADMIN — HYDROMORPHONE HYDROCHLORIDE PRN MG: 1 INJECTION, SOLUTION INTRAMUSCULAR; INTRAVENOUS; SUBCUTANEOUS at 23:16

## 2020-09-12 RX ADMIN — HYDROMORPHONE HYDROCHLORIDE PRN MG: 1 INJECTION, SOLUTION INTRAMUSCULAR; INTRAVENOUS; SUBCUTANEOUS at 01:16

## 2020-09-12 RX ADMIN — HEPARIN SODIUM SCH UNIT: 5000 INJECTION, SOLUTION INTRAVENOUS; SUBCUTANEOUS at 15:35

## 2020-09-12 RX ADMIN — HYDROMORPHONE HYDROCHLORIDE PRN MG: 1 INJECTION, SOLUTION INTRAMUSCULAR; INTRAVENOUS; SUBCUTANEOUS at 14:47

## 2020-09-12 RX ADMIN — FAMOTIDINE SCH MG: 10 INJECTION INTRAVENOUS at 08:27

## 2020-09-12 RX ADMIN — AMPICILLIN AND SULBACTAM SCH MLS/HR: 1; .5 INJECTION, POWDER, FOR SOLUTION INTRAVENOUS at 05:35

## 2020-09-12 RX ADMIN — HEPARIN SODIUM SCH UNIT: 5000 INJECTION, SOLUTION INTRAVENOUS; SUBCUTANEOUS at 21:34

## 2020-09-12 RX ADMIN — ASCORBIC ACID, FOLIC ACID, NIACIN, THIAMINE, RIBOFLAVIN, PYRIDOXINE, CYANOCOBALAMIN, PANTOTHENIC ACID, BIOTIN SCH CAP: 100; 150; 6; 1; 20; 5; 10; 1.7; 1.5 CAPSULE, LIQUID FILLED ORAL at 08:27

## 2020-09-12 NOTE — NUR
PM Assessment



Received pt awake with rt foot elevated on 3 folded blankets, s/p cutting of skin soft 
tissue & bone with removal of right 2nd toe & portion of metatarsals with tendon repair 
today, NS at 100cc/hr infusing well.  Routine assessment done, plan of care discuss, denies 
discomfort at this time. Pt reminded no weight bearing on his right foot, pending physical 
therapy eval & treat for tomorrow.

## 2020-09-13 VITALS — SYSTOLIC BLOOD PRESSURE: 153 MMHG | DIASTOLIC BLOOD PRESSURE: 93 MMHG

## 2020-09-13 VITALS — DIASTOLIC BLOOD PRESSURE: 79 MMHG | SYSTOLIC BLOOD PRESSURE: 137 MMHG

## 2020-09-13 VITALS — SYSTOLIC BLOOD PRESSURE: 141 MMHG | DIASTOLIC BLOOD PRESSURE: 92 MMHG

## 2020-09-13 VITALS — DIASTOLIC BLOOD PRESSURE: 83 MMHG | SYSTOLIC BLOOD PRESSURE: 144 MMHG

## 2020-09-13 VITALS — DIASTOLIC BLOOD PRESSURE: 75 MMHG | SYSTOLIC BLOOD PRESSURE: 136 MMHG

## 2020-09-13 VITALS — DIASTOLIC BLOOD PRESSURE: 89 MMHG | SYSTOLIC BLOOD PRESSURE: 151 MMHG

## 2020-09-13 LAB
ALBUMIN SERPL-MCNC: 2.6 G/DL (ref 3.5–5)
ALT SERPL-CCNC: 22 U/L (ref 12–78)
AST SERPL-CCNC: 24 U/L (ref 10–37)
BASOPHILS NFR BLD AUTO: 0.5 % (ref 0–5)
BILIRUB SERPL-MCNC: 0.3 MG/DL (ref 0.2–1)
BUN SERPL-MCNC: 20 MG/DL (ref 7–18)
CHLORIDE SERPL-SCNC: 103 MMOL/L (ref 101–111)
CO2 SERPL-SCNC: 25 MMOL/L (ref 21–32)
CREAT SERPL-MCNC: 1 MG/DL (ref 0.5–1.5)
EOSINOPHIL NFR BLD AUTO: 2.8 % (ref 0–8)
ERYTHROCYTE [DISTWIDTH] IN BLOOD BY AUTOMATED COUNT: 13.2 % (ref 11–15.5)
GFR SERPL CREATININE-BSD FRML MDRD: 90 ML/MIN (ref 60–?)
GLUCOSE SERPL-MCNC: 161 MG/DL (ref 70–105)
HCT VFR BLD AUTO: 26.2 % (ref 42–54)
LYMPHOCYTES NFR SPEC AUTO: 22.2 % (ref 21–51)
MCH RBC QN AUTO: 25.9 PG (ref 27–33)
MCHC RBC AUTO-ENTMCNC: 31.3 G/DL (ref 32–36)
MCV RBC AUTO: 82.9 FL (ref 79–99)
MONOCYTES NFR BLD AUTO: 9.3 % (ref 3–13)
NEUTROPHILS NFR BLD AUTO: 63.5 % (ref 40–77)
NRBC BLD MANUAL-RTO: 0 % (ref 0–0.19)
PLATELET # BLD AUTO: 396 K/UL (ref 130–400)
POTASSIUM SERPL-SCNC: 4.6 MMOL/L (ref 3.5–5.1)
PROT SERPL-MCNC: 7.5 G/DL (ref 6–8.3)
RBC # BLD AUTO: 3.16 MIL/UL (ref 4.5–6.2)
SODIUM SERPL-SCNC: 136 MMOL/L (ref 136–145)
WBC # BLD AUTO: 10.5 K/UL (ref 4.8–10.8)

## 2020-09-13 RX ADMIN — ASCORBIC ACID, FOLIC ACID, NIACIN, THIAMINE, RIBOFLAVIN, PYRIDOXINE, CYANOCOBALAMIN, PANTOTHENIC ACID, BIOTIN SCH CAP: 100; 150; 6; 1; 20; 5; 10; 1.7; 1.5 CAPSULE, LIQUID FILLED ORAL at 08:53

## 2020-09-13 RX ADMIN — ACETAMINOPHEN AND CODEINE PHOSPHATE PRN TAB: 300; 30 TABLET ORAL at 19:03

## 2020-09-13 RX ADMIN — AMPICILLIN AND SULBACTAM SCH MLS/HR: 1; .5 INJECTION, POWDER, FOR SOLUTION INTRAVENOUS at 18:29

## 2020-09-13 RX ADMIN — HYDROMORPHONE HYDROCHLORIDE PRN MG: 1 INJECTION, SOLUTION INTRAMUSCULAR; INTRAVENOUS; SUBCUTANEOUS at 16:02

## 2020-09-13 RX ADMIN — HEPARIN SODIUM SCH UNIT: 5000 INJECTION, SOLUTION INTRAVENOUS; SUBCUTANEOUS at 08:58

## 2020-09-13 RX ADMIN — SODIUM CHLORIDE SCH UNIT: 9 INJECTION, SOLUTION INTRAVENOUS at 16:30

## 2020-09-13 RX ADMIN — HYDROMORPHONE HYDROCHLORIDE PRN MG: 1 INJECTION, SOLUTION INTRAMUSCULAR; INTRAVENOUS; SUBCUTANEOUS at 08:54

## 2020-09-13 RX ADMIN — ACETAMINOPHEN AND CODEINE PHOSPHATE PRN TAB: 300; 30 TABLET ORAL at 12:52

## 2020-09-13 RX ADMIN — AMPICILLIN AND SULBACTAM SCH MLS/HR: 1; .5 INJECTION, POWDER, FOR SOLUTION INTRAVENOUS at 06:28

## 2020-09-13 RX ADMIN — FAMOTIDINE SCH MG: 10 INJECTION INTRAVENOUS at 08:53

## 2020-09-13 RX ADMIN — FAMOTIDINE SCH MG: 10 INJECTION INTRAVENOUS at 21:45

## 2020-09-13 RX ADMIN — SODIUM CHLORIDE SCH UNIT: 9 INJECTION, SOLUTION INTRAVENOUS at 06:23

## 2020-09-13 RX ADMIN — SODIUM CHLORIDE SCH MLS/HR: 0.9 INJECTION, SOLUTION INTRAVENOUS at 23:00

## 2020-09-13 RX ADMIN — SODIUM CHLORIDE SCH UNIT: 9 INJECTION, SOLUTION INTRAVENOUS at 11:30

## 2020-09-13 RX ADMIN — AMPICILLIN AND SULBACTAM SCH MLS/HR: 1; .5 INJECTION, POWDER, FOR SOLUTION INTRAVENOUS at 23:00

## 2020-09-13 RX ADMIN — AMPICILLIN AND SULBACTAM SCH MLS/HR: 1; .5 INJECTION, POWDER, FOR SOLUTION INTRAVENOUS at 12:16

## 2020-09-13 RX ADMIN — HEPARIN SODIUM SCH UNIT: 5000 INJECTION, SOLUTION INTRAVENOUS; SUBCUTANEOUS at 14:35

## 2020-09-13 RX ADMIN — INSULIN GLARGINE SCH UNITS: 100 INJECTION, SOLUTION SUBCUTANEOUS at 06:44

## 2020-09-13 RX ADMIN — HYDROMORPHONE HYDROCHLORIDE PRN MG: 1 INJECTION, SOLUTION INTRAMUSCULAR; INTRAVENOUS; SUBCUTANEOUS at 21:45

## 2020-09-13 RX ADMIN — HEPARIN SODIUM SCH UNIT: 5000 INJECTION, SOLUTION INTRAVENOUS; SUBCUTANEOUS at 21:58

## 2020-09-13 RX ADMIN — HYDROMORPHONE HYDROCHLORIDE PRN MG: 1 INJECTION, SOLUTION INTRAMUSCULAR; INTRAVENOUS; SUBCUTANEOUS at 03:43

## 2020-09-13 RX ADMIN — SODIUM CHLORIDE SCH UNIT: 9 INJECTION, SOLUTION INTRAVENOUS at 21:58

## 2020-09-13 RX ADMIN — AMLODIPINE BESYLATE SCH MG: 5 TABLET ORAL at 08:53

## 2020-09-14 VITALS — DIASTOLIC BLOOD PRESSURE: 78 MMHG | SYSTOLIC BLOOD PRESSURE: 130 MMHG

## 2020-09-14 VITALS — SYSTOLIC BLOOD PRESSURE: 125 MMHG | DIASTOLIC BLOOD PRESSURE: 71 MMHG

## 2020-09-14 VITALS — DIASTOLIC BLOOD PRESSURE: 94 MMHG | SYSTOLIC BLOOD PRESSURE: 165 MMHG

## 2020-09-14 VITALS — SYSTOLIC BLOOD PRESSURE: 176 MMHG | DIASTOLIC BLOOD PRESSURE: 92 MMHG

## 2020-09-14 VITALS — SYSTOLIC BLOOD PRESSURE: 136 MMHG | DIASTOLIC BLOOD PRESSURE: 77 MMHG

## 2020-09-14 VITALS — SYSTOLIC BLOOD PRESSURE: 142 MMHG | DIASTOLIC BLOOD PRESSURE: 80 MMHG

## 2020-09-14 VITALS — SYSTOLIC BLOOD PRESSURE: 130 MMHG | DIASTOLIC BLOOD PRESSURE: 84 MMHG

## 2020-09-14 LAB
BASOPHILS NFR BLD AUTO: 0.5 % (ref 0–5)
BUN SERPL-MCNC: 14 MG/DL (ref 7–18)
CHLORIDE SERPL-SCNC: 105 MMOL/L (ref 101–111)
CO2 SERPL-SCNC: 25 MMOL/L (ref 21–32)
CREAT SERPL-MCNC: 1.1 MG/DL (ref 0.5–1.5)
EOSINOPHIL NFR BLD AUTO: 3.4 % (ref 0–8)
ERYTHROCYTE [DISTWIDTH] IN BLOOD BY AUTOMATED COUNT: 13.2 % (ref 11–15.5)
GFR SERPL CREATININE-BSD FRML MDRD: 81 ML/MIN (ref 60–?)
GLUCOSE SERPL-MCNC: 111 MG/DL (ref 70–105)
HCT VFR BLD AUTO: 23.7 % (ref 42–54)
LYMPHOCYTES NFR SPEC AUTO: 24.3 % (ref 21–51)
MCH RBC QN AUTO: 26.1 PG (ref 27–33)
MCHC RBC AUTO-ENTMCNC: 31.6 G/DL (ref 32–36)
MCV RBC AUTO: 82.6 FL (ref 79–99)
MONOCYTES NFR BLD AUTO: 9.4 % (ref 3–13)
NEUTROPHILS NFR BLD AUTO: 60.2 % (ref 40–77)
NRBC BLD MANUAL-RTO: 0 % (ref 0–0.19)
PLATELET # BLD AUTO: 415 K/UL (ref 130–400)
POTASSIUM SERPL-SCNC: 4.3 MMOL/L (ref 3.5–5.1)
RBC # BLD AUTO: 2.87 MIL/UL (ref 4.5–6.2)
SODIUM SERPL-SCNC: 138 MMOL/L (ref 136–145)
WBC # BLD AUTO: 10.5 K/UL (ref 4.8–10.8)

## 2020-09-14 RX ADMIN — FAMOTIDINE SCH MG: 10 INJECTION INTRAVENOUS at 09:18

## 2020-09-14 RX ADMIN — SODIUM CHLORIDE SCH MLS/HR: 0.9 INJECTION, SOLUTION INTRAVENOUS at 14:05

## 2020-09-14 RX ADMIN — HEPARIN SODIUM SCH UNIT: 5000 INJECTION, SOLUTION INTRAVENOUS; SUBCUTANEOUS at 09:28

## 2020-09-14 RX ADMIN — AMPICILLIN AND SULBACTAM SCH MLS/HR: 1; .5 INJECTION, POWDER, FOR SOLUTION INTRAVENOUS at 12:17

## 2020-09-14 RX ADMIN — HEPARIN SODIUM SCH UNIT: 5000 INJECTION, SOLUTION INTRAVENOUS; SUBCUTANEOUS at 20:09

## 2020-09-14 RX ADMIN — AMPICILLIN AND SULBACTAM SCH MLS/HR: 1; .5 INJECTION, POWDER, FOR SOLUTION INTRAVENOUS at 18:38

## 2020-09-14 RX ADMIN — SODIUM CHLORIDE SCH MLS/HR: 0.9 INJECTION, SOLUTION INTRAVENOUS at 22:06

## 2020-09-14 RX ADMIN — HYDROMORPHONE HYDROCHLORIDE PRN MG: 1 INJECTION, SOLUTION INTRAMUSCULAR; INTRAVENOUS; SUBCUTANEOUS at 09:19

## 2020-09-14 RX ADMIN — HEPARIN SODIUM SCH UNIT: 5000 INJECTION, SOLUTION INTRAVENOUS; SUBCUTANEOUS at 14:29

## 2020-09-14 RX ADMIN — SODIUM CHLORIDE SCH MLS/HR: 0.9 INJECTION, SOLUTION INTRAVENOUS at 04:05

## 2020-09-14 RX ADMIN — ASCORBIC ACID, FOLIC ACID, NIACIN, THIAMINE, RIBOFLAVIN, PYRIDOXINE, CYANOCOBALAMIN, PANTOTHENIC ACID, BIOTIN SCH CAP: 100; 150; 6; 1; 20; 5; 10; 1.7; 1.5 CAPSULE, LIQUID FILLED ORAL at 09:18

## 2020-09-14 RX ADMIN — SODIUM CHLORIDE SCH UNIT: 9 INJECTION, SOLUTION INTRAVENOUS at 12:18

## 2020-09-14 RX ADMIN — AMLODIPINE BESYLATE SCH MG: 5 TABLET ORAL at 09:18

## 2020-09-14 RX ADMIN — HYDROMORPHONE HYDROCHLORIDE PRN MG: 1 INJECTION, SOLUTION INTRAMUSCULAR; INTRAVENOUS; SUBCUTANEOUS at 20:02

## 2020-09-14 RX ADMIN — ACETAMINOPHEN AND CODEINE PHOSPHATE PRN TAB: 300; 30 TABLET ORAL at 14:01

## 2020-09-14 RX ADMIN — SODIUM CHLORIDE SCH UNIT: 9 INJECTION, SOLUTION INTRAVENOUS at 20:09

## 2020-09-14 RX ADMIN — SODIUM CHLORIDE SCH UNIT: 9 INJECTION, SOLUTION INTRAVENOUS at 06:10

## 2020-09-14 RX ADMIN — SODIUM CHLORIDE SCH UNIT: 9 INJECTION, SOLUTION INTRAVENOUS at 17:59

## 2020-09-14 RX ADMIN — FAMOTIDINE SCH MG: 10 INJECTION INTRAVENOUS at 20:01

## 2020-09-14 RX ADMIN — INSULIN GLARGINE SCH UNITS: 100 INJECTION, SOLUTION SUBCUTANEOUS at 06:27

## 2020-09-14 RX ADMIN — AMPICILLIN AND SULBACTAM SCH MLS/HR: 1; .5 INJECTION, POWDER, FOR SOLUTION INTRAVENOUS at 09:19

## 2020-09-14 NOTE — NUR
CM Note: [facility] pending approval

CM met with pt discussed MD recommendations for HH for woundcare, pt agreeable, JEFFY signed 
for [facility] will approve.



Faxed order, clinicals, PT, confirmation received. Pt pending woundcare order recs from Dr Quezada at this time, will send once MD give recommendations. Pt pending approval. Primary 
nurse aware. CM to continue to follow up.

## 2020-09-15 VITALS — DIASTOLIC BLOOD PRESSURE: 90 MMHG | SYSTOLIC BLOOD PRESSURE: 143 MMHG

## 2020-09-15 VITALS — DIASTOLIC BLOOD PRESSURE: 85 MMHG | SYSTOLIC BLOOD PRESSURE: 141 MMHG

## 2020-09-15 VITALS — SYSTOLIC BLOOD PRESSURE: 132 MMHG | DIASTOLIC BLOOD PRESSURE: 77 MMHG

## 2020-09-15 RX ADMIN — HEPARIN SODIUM SCH UNIT: 5000 INJECTION, SOLUTION INTRAVENOUS; SUBCUTANEOUS at 09:31

## 2020-09-15 RX ADMIN — HYDROMORPHONE HYDROCHLORIDE PRN MG: 1 INJECTION, SOLUTION INTRAMUSCULAR; INTRAVENOUS; SUBCUTANEOUS at 09:16

## 2020-09-15 RX ADMIN — AMPICILLIN AND SULBACTAM SCH MLS/HR: 1; .5 INJECTION, POWDER, FOR SOLUTION INTRAVENOUS at 13:16

## 2020-09-15 RX ADMIN — SODIUM CHLORIDE SCH UNIT: 9 INJECTION, SOLUTION INTRAVENOUS at 05:31

## 2020-09-15 RX ADMIN — ASCORBIC ACID, FOLIC ACID, NIACIN, THIAMINE, RIBOFLAVIN, PYRIDOXINE, CYANOCOBALAMIN, PANTOTHENIC ACID, BIOTIN SCH CAP: 100; 150; 6; 1; 20; 5; 10; 1.7; 1.5 CAPSULE, LIQUID FILLED ORAL at 09:19

## 2020-09-15 RX ADMIN — SODIUM CHLORIDE SCH UNIT: 9 INJECTION, SOLUTION INTRAVENOUS at 12:56

## 2020-09-15 RX ADMIN — SODIUM CHLORIDE SCH MLS/HR: 0.9 INJECTION, SOLUTION INTRAVENOUS at 05:24

## 2020-09-15 RX ADMIN — AMPICILLIN AND SULBACTAM SCH MLS/HR: 1; .5 INJECTION, POWDER, FOR SOLUTION INTRAVENOUS at 00:05

## 2020-09-15 RX ADMIN — FAMOTIDINE SCH MG: 10 INJECTION INTRAVENOUS at 09:22

## 2020-09-15 RX ADMIN — INSULIN GLARGINE SCH UNITS: 100 INJECTION, SOLUTION SUBCUTANEOUS at 06:00

## 2020-09-15 RX ADMIN — ACETAMINOPHEN AND CODEINE PHOSPHATE PRN TAB: 300; 30 TABLET ORAL at 05:24

## 2020-09-15 RX ADMIN — AMPICILLIN AND SULBACTAM SCH MLS/HR: 1; .5 INJECTION, POWDER, FOR SOLUTION INTRAVENOUS at 05:24

## 2020-09-15 RX ADMIN — ACETAMINOPHEN AND CODEINE PHOSPHATE PRN TAB: 300; 30 TABLET ORAL at 12:05

## 2020-09-15 RX ADMIN — AMLODIPINE BESYLATE SCH MG: 5 TABLET ORAL at 09:20

## 2020-09-15 NOTE — NUR
CM Note: [employer] [facility] pending approval and delivery

Pt requesting walker upon DC. JEFFY signed for any DME company that will approve. Informed pt 
CM unable to guarantee walker will be approved today and delivered. Patient verbalized it's 
ok he will wait at home and will use his cane temporarily until able to deliver, pt 
ambulating in room independently. 



Faxed and emailed request to [employer] [facility], confirmation received. Pt pending approval and 
delivery at this time. Primary nurse Nory aware. Pt request to go home regardless, 
verbalized will be able to manage at home. CM to cont to follow up.

## 2020-09-15 NOTE — NUR
CM Note: [facility] approval

CM spoke to Taniya w/[employer] . Pt has approval. Safe to dc via private car once MD clear. 
Primary nurse aware. CM to cont to follow up.

## 2020-09-30 ENCOUNTER — HOSPITAL ENCOUNTER (EMERGENCY)
Dept: HOSPITAL 90 - EDH | Age: 35
Discharge: HOME | End: 2020-09-30
Payer: MEDICAID

## 2020-09-30 DIAGNOSIS — Z90.49: ICD-10-CM

## 2020-09-30 DIAGNOSIS — F41.1: ICD-10-CM

## 2020-09-30 DIAGNOSIS — Z98.890: ICD-10-CM

## 2020-09-30 DIAGNOSIS — L76.32: Primary | ICD-10-CM

## 2020-09-30 DIAGNOSIS — Z88.1: ICD-10-CM

## 2020-09-30 DIAGNOSIS — I10: ICD-10-CM

## 2020-09-30 DIAGNOSIS — E78.5: ICD-10-CM

## 2020-09-30 DIAGNOSIS — E11.9: ICD-10-CM

## 2020-09-30 PROCEDURE — 96372 THER/PROPH/DIAG INJ SC/IM: CPT

## 2020-09-30 PROCEDURE — 99283 EMERGENCY DEPT VISIT LOW MDM: CPT

## 2020-11-29 ENCOUNTER — HOSPITAL ENCOUNTER (INPATIENT)
Dept: HOSPITAL 90 - EDH | Age: 35
LOS: 3 days | Discharge: HOME | DRG: 383 | End: 2020-12-02
Attending: FAMILY MEDICINE | Admitting: FAMILY MEDICINE
Payer: MEDICAID

## 2020-11-29 VITALS — BODY MASS INDEX: 39.17 KG/M2 | WEIGHT: 315 LBS | HEIGHT: 75 IN

## 2020-11-29 DIAGNOSIS — I70.201: ICD-10-CM

## 2020-11-29 DIAGNOSIS — Z87.891: ICD-10-CM

## 2020-11-29 DIAGNOSIS — Z20.828: ICD-10-CM

## 2020-11-29 DIAGNOSIS — I10: ICD-10-CM
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
## 2020-11-29 DIAGNOSIS — Z88.2: ICD-10-CM

## 2020-11-29 DIAGNOSIS — E78.5: ICD-10-CM

## 2020-11-29 DIAGNOSIS — Z83.3: ICD-10-CM

## 2020-11-29 DIAGNOSIS — E66.01: ICD-10-CM
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
## 2020-11-29 DIAGNOSIS — I82.511: ICD-10-CM

## 2020-11-29 DIAGNOSIS — Z82.49: ICD-10-CM

## 2020-11-29 DIAGNOSIS — Z82.5: ICD-10-CM

## 2020-11-29 DIAGNOSIS — E87.6: ICD-10-CM

## 2020-11-29 DIAGNOSIS — E11.51: ICD-10-CM

## 2020-11-29 DIAGNOSIS — Z88.8: ICD-10-CM

## 2020-11-29 DIAGNOSIS — Z89.421: ICD-10-CM

## 2020-11-29 DIAGNOSIS — L03.115: Primary | ICD-10-CM
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
## 2020-11-29 DIAGNOSIS — S92.311A: ICD-10-CM

## 2020-11-29 DIAGNOSIS — Z90.49: ICD-10-CM

## 2020-11-29 DIAGNOSIS — Z79.01: ICD-10-CM

## 2020-11-29 LAB
APTT PPP: 29.4 SEC (ref 26.3–35.5)
BASOPHILS NFR BLD AUTO: 0.2 % (ref 0–5)
DEPRECATED SQUAMOUS URNS QL MICRO: (no result) /HPF (ref 0–2)
EOSINOPHIL NFR BLD AUTO: 0.1 % (ref 0–8)
ERYTHROCYTE [DISTWIDTH] IN BLOOD BY AUTOMATED COUNT: 12 % (ref 11–15.5)
GLUCOSE UR STRIP-MCNC: >=1000 MG/DL
HCT VFR BLD AUTO: 42 % (ref 42–54)
INR PPP: 0.96 (ref 0.85–1.15)
LYMPHOCYTES NFR SPEC AUTO: 17.2 % (ref 21–51)
MCH RBC QN AUTO: 31.7 PG (ref 27–33)
MCHC RBC AUTO-ENTMCNC: 36.2 G/DL (ref 32–36)
MCV RBC AUTO: 87.5 FL (ref 79–99)
MONOCYTES NFR BLD AUTO: 5.4 % (ref 3–13)
NEUTROPHILS NFR BLD AUTO: 76.8 % (ref 40–77)
NRBC BLD MANUAL-RTO: 0 % (ref 0–0.19)
PH UR STRIP: 5 [PH] (ref 5–8)
PLATELET # BLD AUTO: 335 K/UL (ref 130–400)
PROTHROMBIN TIME: 10.4 SEC (ref 9.6–11.6)
RBC # BLD AUTO: 4.8 MIL/UL (ref 4.5–6.2)
RBC #/AREA URNS HPF: (no result) /HPF (ref 0–1)
RBC MORPH BLD: (no result)
SP GR UR STRIP: 1.03 (ref 1–1.03)
UROBILINOGEN UR STRIP-MCNC: 1 MG/DL (ref 0.2–1)
WBC # BLD AUTO: 14.4 K/UL (ref 4.8–10.8)
WBC #/AREA URNS HPF: (no result) /HPF (ref 0–1)

## 2020-11-29 PROCEDURE — 85730 THROMBOPLASTIN TIME PARTIAL: CPT

## 2020-11-29 PROCEDURE — 84484 ASSAY OF TROPONIN QUANT: CPT

## 2020-11-29 PROCEDURE — 87040 BLOOD CULTURE FOR BACTERIA: CPT

## 2020-11-29 PROCEDURE — 73630 X-RAY EXAM OF FOOT: CPT

## 2020-11-29 PROCEDURE — 81001 URINALYSIS AUTO W/SCOPE: CPT

## 2020-11-29 PROCEDURE — 86140 C-REACTIVE PROTEIN: CPT

## 2020-11-29 PROCEDURE — 84145 PROCALCITONIN (PCT): CPT

## 2020-11-29 PROCEDURE — 85610 PROTHROMBIN TIME: CPT

## 2020-11-29 PROCEDURE — 93971 EXTREMITY STUDY: CPT

## 2020-11-29 PROCEDURE — 84100 ASSAY OF PHOSPHORUS: CPT

## 2020-11-29 PROCEDURE — 82948 REAGENT STRIP/BLOOD GLUCOSE: CPT

## 2020-11-29 PROCEDURE — 80053 COMPREHEN METABOLIC PANEL: CPT

## 2020-11-29 PROCEDURE — 83735 ASSAY OF MAGNESIUM: CPT

## 2020-11-29 PROCEDURE — 80202 ASSAY OF VANCOMYCIN: CPT

## 2020-11-29 PROCEDURE — 85027 COMPLETE CBC AUTOMATED: CPT

## 2020-11-29 PROCEDURE — 36415 COLL VENOUS BLD VENIPUNCTURE: CPT

## 2020-11-29 PROCEDURE — 87426 SARSCOV CORONAVIRUS AG IA: CPT

## 2020-11-29 PROCEDURE — 82550 ASSAY OF CK (CPK): CPT

## 2020-11-29 PROCEDURE — 85025 COMPLETE CBC W/AUTO DIFF WBC: CPT

## 2020-11-29 PROCEDURE — 71045 X-RAY EXAM CHEST 1 VIEW: CPT

## 2020-11-29 PROCEDURE — 83036 HEMOGLOBIN GLYCOSYLATED A1C: CPT

## 2020-11-29 PROCEDURE — 80048 BASIC METABOLIC PNL TOTAL CA: CPT

## 2020-11-30 VITALS — SYSTOLIC BLOOD PRESSURE: 147 MMHG | DIASTOLIC BLOOD PRESSURE: 89 MMHG

## 2020-11-30 VITALS — DIASTOLIC BLOOD PRESSURE: 86 MMHG | SYSTOLIC BLOOD PRESSURE: 146 MMHG

## 2020-11-30 VITALS — DIASTOLIC BLOOD PRESSURE: 84 MMHG | SYSTOLIC BLOOD PRESSURE: 135 MMHG

## 2020-11-30 LAB
ALBUMIN SERPL-MCNC: 3.9 G/DL (ref 3.5–5)
ALT SERPL-CCNC: 37 U/L (ref 12–78)
AST SERPL-CCNC: 37 U/L (ref 10–37)
BASOPHILS NFR BLD AUTO: 0.4 % (ref 0–5)
BASOPHILS NFR BLD AUTO: 0.4 % (ref 0–5)
BILIRUB SERPL-MCNC: 0.4 MG/DL (ref 0.2–1)
BUN SERPL-MCNC: 23 MG/DL (ref 7–18)
BUN SERPL-MCNC: 29 MG/DL (ref 7–18)
CHLORIDE SERPL-SCNC: 103 MMOL/L (ref 101–111)
CHLORIDE SERPL-SCNC: 105 MMOL/L (ref 101–111)
CK SERPL-CCNC: 519 U/L (ref 21–232)
CO2 SERPL-SCNC: 25 MMOL/L (ref 21–32)
CO2 SERPL-SCNC: 26 MMOL/L (ref 21–32)
CREAT SERPL-MCNC: 1.2 MG/DL (ref 0.5–1.5)
CREAT SERPL-MCNC: 1.4 MG/DL (ref 0.5–1.5)
CRP SERPL HS-MCNC: 61 MG/L (ref 0–9)
EOSINOPHIL NFR BLD AUTO: 2.2 % (ref 0–8)
EOSINOPHIL NFR BLD AUTO: 3.5 % (ref 0–8)
ERYTHROCYTE [DISTWIDTH] IN BLOOD BY AUTOMATED COUNT: 14.2 % (ref 11–15.5)
ERYTHROCYTE [DISTWIDTH] IN BLOOD BY AUTOMATED COUNT: 14.2 % (ref 11–15.5)
GFR SERPL CREATININE-BSD FRML MDRD: 61 ML/MIN (ref 60–?)
GFR SERPL CREATININE-BSD FRML MDRD: 72 ML/MIN (ref 60–?)
GLUCOSE SERPL-MCNC: 127 MG/DL (ref 70–105)
GLUCOSE SERPL-MCNC: 98 MG/DL (ref 70–105)
HCT VFR BLD AUTO: 35.9 % (ref 42–54)
HCT VFR BLD AUTO: 38.3 % (ref 42–54)
LYMPHOCYTES NFR SPEC AUTO: 17.3 % (ref 21–51)
LYMPHOCYTES NFR SPEC AUTO: 19.7 % (ref 21–51)
MCH RBC QN AUTO: 25.9 PG (ref 27–33)
MCH RBC QN AUTO: 26.4 PG (ref 27–33)
MCHC RBC AUTO-ENTMCNC: 31.5 G/DL (ref 32–36)
MCHC RBC AUTO-ENTMCNC: 32.1 G/DL (ref 32–36)
MCV RBC AUTO: 82.2 FL (ref 79–99)
MCV RBC AUTO: 82.2 FL (ref 79–99)
MONOCYTES NFR BLD AUTO: 8.2 % (ref 3–13)
MONOCYTES NFR BLD AUTO: 9.1 % (ref 3–13)
NEUTROPHILS NFR BLD AUTO: 66.8 % (ref 40–77)
NEUTROPHILS NFR BLD AUTO: 71.4 % (ref 40–77)
NRBC BLD MANUAL-RTO: 0 % (ref 0–0.19)
NRBC BLD MANUAL-RTO: 0 % (ref 0–0.19)
PLATELET # BLD AUTO: 246 K/UL (ref 130–400)
PLATELET # BLD AUTO: 277 K/UL (ref 130–400)
POTASSIUM SERPL-SCNC: 4 MMOL/L (ref 3.5–5.1)
POTASSIUM SERPL-SCNC: 4 MMOL/L (ref 3.5–5.1)
PROT SERPL-MCNC: 7.8 G/DL (ref 6–8.3)
RBC # BLD AUTO: 4.37 MIL/UL (ref 4.5–6.2)
RBC # BLD AUTO: 4.66 MIL/UL (ref 4.5–6.2)
SODIUM SERPL-SCNC: 141 MMOL/L (ref 136–145)
SODIUM SERPL-SCNC: 143 MMOL/L (ref 136–145)
WBC # BLD AUTO: 11.2 K/UL (ref 4.8–10.8)
WBC # BLD AUTO: 13.5 K/UL (ref 4.8–10.8)

## 2020-11-30 RX ADMIN — ENOXAPARIN SODIUM SCH MG: 30 INJECTION SUBCUTANEOUS at 19:52

## 2020-11-30 RX ADMIN — ENOXAPARIN SODIUM SCH MG: 120 INJECTION SUBCUTANEOUS at 09:00

## 2020-11-30 RX ADMIN — PIPERACILLIN SODIUM AND TAZOBACTAM SODIUM SCH MLS/HR: .375; 3 INJECTION, POWDER, LYOPHILIZED, FOR SOLUTION INTRAVENOUS at 19:52

## 2020-11-30 RX ADMIN — SODIUM CHLORIDE SCH UNIT: 9 INJECTION, SOLUTION INTRAVENOUS at 19:30

## 2020-11-30 RX ADMIN — SODIUM CHLORIDE SCH MLS/HR: 9 INJECTION, SOLUTION INTRAVENOUS at 19:54

## 2020-11-30 RX ADMIN — FAMOTIDINE SCH MG: 20 TABLET, FILM COATED ORAL at 19:53

## 2020-11-30 RX ADMIN — SODIUM CHLORIDE SCH UNIT: 9 INJECTION, SOLUTION INTRAVENOUS at 11:30

## 2020-11-30 RX ADMIN — ENOXAPARIN SODIUM SCH MG: 30 INJECTION SUBCUTANEOUS at 09:00

## 2020-11-30 RX ADMIN — SODIUM CHLORIDE SCH UNIT: 9 INJECTION, SOLUTION INTRAVENOUS at 16:30

## 2020-11-30 RX ADMIN — PIPERACILLIN SODIUM AND TAZOBACTAM SODIUM SCH MLS/HR: .375; 3 INJECTION, POWDER, LYOPHILIZED, FOR SOLUTION INTRAVENOUS at 05:00

## 2020-11-30 RX ADMIN — SODIUM CHLORIDE SCH MLS/HR: 9 INJECTION, SOLUTION INTRAVENOUS at 09:00

## 2020-11-30 RX ADMIN — ENOXAPARIN SODIUM SCH MG: 120 INJECTION SUBCUTANEOUS at 19:53

## 2020-11-30 RX ADMIN — FAMOTIDINE SCH MG: 20 TABLET, FILM COATED ORAL at 09:00

## 2020-11-30 RX ADMIN — MORPHINE SULFATE PRN MG: 2 INJECTION, SOLUTION INTRAMUSCULAR; INTRAVENOUS at 17:41

## 2020-11-30 RX ADMIN — SODIUM CHLORIDE SCH UNIT: 9 INJECTION, SOLUTION INTRAVENOUS at 07:30

## 2020-11-30 RX ADMIN — MORPHINE SULFATE PRN MG: 2 INJECTION, SOLUTION INTRAMUSCULAR; INTRAVENOUS at 21:45

## 2020-11-30 RX ADMIN — PIPERACILLIN SODIUM AND TAZOBACTAM SODIUM SCH MLS/HR: .375; 3 INJECTION, POWDER, LYOPHILIZED, FOR SOLUTION INTRAVENOUS at 13:00

## 2020-11-30 NOTE — NUR
cm note

 met with patient and states resides at home with father, pt uses cane for ambulation, has a 
walker does not currently use,has provider 2hrs daily for adls, father assists with 
transport. dc plan is back home at time of dc. [facility]) but discharged him. on 11/14/20.  dc plan is back home. feels safe to return back home. 

-------------------------------------------------------------------------------

Addendum: 11/30/20 at 1755 by SOCORRO HENRIQUEZ CM

-------------------------------------------------------------------------------

Amended: Links added.

## 2020-12-01 VITALS — SYSTOLIC BLOOD PRESSURE: 160 MMHG | DIASTOLIC BLOOD PRESSURE: 93 MMHG

## 2020-12-01 VITALS — SYSTOLIC BLOOD PRESSURE: 130 MMHG | DIASTOLIC BLOOD PRESSURE: 76 MMHG

## 2020-12-01 VITALS — DIASTOLIC BLOOD PRESSURE: 74 MMHG | SYSTOLIC BLOOD PRESSURE: 138 MMHG

## 2020-12-01 VITALS — DIASTOLIC BLOOD PRESSURE: 82 MMHG | SYSTOLIC BLOOD PRESSURE: 137 MMHG

## 2020-12-01 VITALS — SYSTOLIC BLOOD PRESSURE: 130 MMHG | DIASTOLIC BLOOD PRESSURE: 80 MMHG

## 2020-12-01 LAB
BUN SERPL-MCNC: 15 MG/DL (ref 7–18)
CHLORIDE SERPL-SCNC: 106 MMOL/L (ref 101–111)
CO2 SERPL-SCNC: 26 MMOL/L (ref 21–32)
CREAT SERPL-MCNC: 1.1 MG/DL (ref 0.5–1.5)
ERYTHROCYTE [DISTWIDTH] IN BLOOD BY AUTOMATED COUNT: 14 % (ref 11–15.5)
GFR SERPL CREATININE-BSD FRML MDRD: 80 ML/MIN (ref 60–?)
GLUCOSE SERPL-MCNC: 156 MG/DL (ref 70–105)
HBA1C MFR BLD: 8.2 % (ref 4–6)
HCT VFR BLD AUTO: 36.8 % (ref 42–54)
MAGNESIUM SERPL-MCNC: 2.1 MG/DL (ref 1.8–2.4)
MCH RBC QN AUTO: 25.7 PG (ref 27–33)
MCHC RBC AUTO-ENTMCNC: 31 G/DL (ref 32–36)
MCV RBC AUTO: 83.1 FL (ref 79–99)
NRBC BLD MANUAL-RTO: 0 % (ref 0–0.19)
PHOSPHATE SERPL-MCNC: 3.3 MG/DL (ref 2.5–4.9)
PLATELET # BLD AUTO: 271 K/UL (ref 130–400)
POTASSIUM SERPL-SCNC: 4.3 MMOL/L (ref 3.5–5.1)
RBC # BLD AUTO: 4.43 MIL/UL (ref 4.5–6.2)
SODIUM SERPL-SCNC: 139 MMOL/L (ref 136–145)
WBC # BLD AUTO: 8.3 K/UL (ref 4.8–10.8)

## 2020-12-01 RX ADMIN — MORPHINE SULFATE PRN MG: 2 INJECTION, SOLUTION INTRAMUSCULAR; INTRAVENOUS at 09:32

## 2020-12-01 RX ADMIN — Medication SCH MG: at 17:56

## 2020-12-01 RX ADMIN — MORPHINE SULFATE PRN MG: 2 INJECTION, SOLUTION INTRAMUSCULAR; INTRAVENOUS at 13:33

## 2020-12-01 RX ADMIN — SODIUM CHLORIDE SCH MLS/HR: 9 INJECTION, SOLUTION INTRAVENOUS at 20:54

## 2020-12-01 RX ADMIN — SODIUM CHLORIDE SCH UNIT: 9 INJECTION, SOLUTION INTRAVENOUS at 16:48

## 2020-12-01 RX ADMIN — MORPHINE SULFATE PRN MG: 2 INJECTION, SOLUTION INTRAMUSCULAR; INTRAVENOUS at 20:16

## 2020-12-01 RX ADMIN — SODIUM CHLORIDE SCH UNIT: 9 INJECTION, SOLUTION INTRAVENOUS at 05:29

## 2020-12-01 RX ADMIN — SODIUM CHLORIDE SCH UNIT: 9 INJECTION, SOLUTION INTRAVENOUS at 20:24

## 2020-12-01 RX ADMIN — FAMOTIDINE SCH MG: 20 TABLET, FILM COATED ORAL at 20:16

## 2020-12-01 RX ADMIN — SODIUM CHLORIDE SCH UNIT: 9 INJECTION, SOLUTION INTRAVENOUS at 11:27

## 2020-12-01 RX ADMIN — FAMOTIDINE SCH MG: 20 TABLET, FILM COATED ORAL at 09:11

## 2020-12-01 RX ADMIN — SODIUM CHLORIDE SCH MLS/HR: 9 INJECTION, SOLUTION INTRAVENOUS at 09:13

## 2020-12-01 RX ADMIN — PIPERACILLIN SODIUM AND TAZOBACTAM SODIUM SCH MLS/HR: .375; 3 INJECTION, POWDER, LYOPHILIZED, FOR SOLUTION INTRAVENOUS at 03:55

## 2020-12-01 RX ADMIN — MORPHINE SULFATE PRN MG: 2 INJECTION, SOLUTION INTRAMUSCULAR; INTRAVENOUS at 04:29

## 2020-12-01 RX ADMIN — ENOXAPARIN SODIUM SCH MG: 30 INJECTION SUBCUTANEOUS at 20:20

## 2020-12-01 RX ADMIN — ENOXAPARIN SODIUM SCH MG: 30 INJECTION SUBCUTANEOUS at 09:12

## 2020-12-01 RX ADMIN — SODIUM CHLORIDE SCH MLS/HR: 0.9 INJECTION, SOLUTION INTRAVENOUS at 14:45

## 2020-12-01 RX ADMIN — ENOXAPARIN SODIUM SCH MG: 120 INJECTION SUBCUTANEOUS at 09:11

## 2020-12-01 RX ADMIN — Medication SCH MG: at 11:02

## 2020-12-01 RX ADMIN — ENOXAPARIN SODIUM SCH MG: 120 INJECTION SUBCUTANEOUS at 20:21

## 2020-12-02 VITALS — SYSTOLIC BLOOD PRESSURE: 159 MMHG | DIASTOLIC BLOOD PRESSURE: 83 MMHG

## 2020-12-02 VITALS — SYSTOLIC BLOOD PRESSURE: 148 MMHG | DIASTOLIC BLOOD PRESSURE: 86 MMHG

## 2020-12-02 VITALS — SYSTOLIC BLOOD PRESSURE: 175 MMHG | DIASTOLIC BLOOD PRESSURE: 83 MMHG

## 2020-12-02 LAB
BASOPHILS NFR BLD AUTO: 0.7 % (ref 0–5)
BUN SERPL-MCNC: 16 MG/DL (ref 7–18)
CHLORIDE SERPL-SCNC: 106 MMOL/L (ref 101–111)
CO2 SERPL-SCNC: 26 MMOL/L (ref 21–32)
CREAT SERPL-MCNC: 0.9 MG/DL (ref 0.5–1.5)
EOSINOPHIL NFR BLD AUTO: 5.8 % (ref 0–8)
ERYTHROCYTE [DISTWIDTH] IN BLOOD BY AUTOMATED COUNT: 13.7 % (ref 11–15.5)
GFR SERPL CREATININE-BSD FRML MDRD: 101 ML/MIN (ref 60–?)
GLUCOSE SERPL-MCNC: 217 MG/DL (ref 70–105)
HCT VFR BLD AUTO: 34 % (ref 42–54)
LYMPHOCYTES NFR SPEC AUTO: 35.5 % (ref 21–51)
MCH RBC QN AUTO: 25.5 PG (ref 27–33)
MCHC RBC AUTO-ENTMCNC: 30.9 G/DL (ref 32–36)
MCV RBC AUTO: 82.5 FL (ref 79–99)
MONOCYTES NFR BLD AUTO: 9.5 % (ref 3–13)
NEUTROPHILS NFR BLD AUTO: 48.1 % (ref 40–77)
NRBC BLD MANUAL-RTO: 0 % (ref 0–0.19)
PLATELET # BLD AUTO: 266 K/UL (ref 130–400)
POTASSIUM SERPL-SCNC: 4.1 MMOL/L (ref 3.5–5.1)
RBC # BLD AUTO: 4.12 MIL/UL (ref 4.5–6.2)
SODIUM SERPL-SCNC: 139 MMOL/L (ref 136–145)
WBC # BLD AUTO: 7 K/UL (ref 4.8–10.8)

## 2020-12-02 RX ADMIN — Medication SCH MG: at 01:44

## 2020-12-02 RX ADMIN — SODIUM CHLORIDE SCH MLS/HR: 0.9 INJECTION, SOLUTION INTRAVENOUS at 03:29

## 2020-12-02 RX ADMIN — MORPHINE SULFATE PRN MG: 2 INJECTION, SOLUTION INTRAMUSCULAR; INTRAVENOUS at 06:08

## 2020-12-02 RX ADMIN — SODIUM CHLORIDE SCH UNIT: 9 INJECTION, SOLUTION INTRAVENOUS at 06:00

## 2020-12-03 NOTE — NUR
Transitional Care - Post Discharge Note



Spoke with patient at number listed. As per Mr Loki, he is doing fine. Patient does state 
he has some pain to the extremity, but that he is going to call Dr Quezada, with whom he 
followed up with yesterday, so Dr Quezada can prescribe some analgesics. Follow up with Dr Stewart has been scheduled for December 16th, which is the earliest available.  No complaints 
of fevers, chills, N/V/D, sob. Educated patient on extremity elevation and s/s of infection. 

-------------------------------------------------------------------------------

Addendum: 12/03/20 at 1214 by TALIB SAMANO

-------------------------------------------------------------------------------

Amended: Links added.

## 2020-12-07 ENCOUNTER — HOSPITAL ENCOUNTER (EMERGENCY)
Dept: HOSPITAL 90 - EDH | Age: 35
Discharge: HOME | End: 2020-12-07
Payer: MEDICAID

## 2020-12-07 DIAGNOSIS — M79.671: Primary | ICD-10-CM

## 2020-12-07 DIAGNOSIS — I10: ICD-10-CM

## 2020-12-07 DIAGNOSIS — E11.9: ICD-10-CM

## 2020-12-07 DIAGNOSIS — M79.674: ICD-10-CM

## 2020-12-07 DIAGNOSIS — Z86.718: ICD-10-CM

## 2020-12-07 DIAGNOSIS — Z88.1: ICD-10-CM

## 2020-12-07 DIAGNOSIS — Z98.890: ICD-10-CM

## 2020-12-07 DIAGNOSIS — E78.5: ICD-10-CM

## 2020-12-07 LAB
ALBUMIN SERPL-MCNC: 3.7 G/DL (ref 3.5–5)
ALT SERPL-CCNC: 42 U/L (ref 12–78)
AST SERPL-CCNC: 25 U/L (ref 10–37)
BASOPHILS NFR BLD AUTO: 0.7 % (ref 0–5)
BILIRUB SERPL-MCNC: 0.3 MG/DL (ref 0.2–1)
BUN SERPL-MCNC: 24 MG/DL (ref 7–18)
CHLORIDE SERPL-SCNC: 98 MMOL/L (ref 101–111)
CO2 SERPL-SCNC: 29 MMOL/L (ref 21–32)
CREAT SERPL-MCNC: 1.4 MG/DL (ref 0.5–1.5)
EOSINOPHIL NFR BLD AUTO: 4.2 % (ref 0–8)
ERYTHROCYTE [DISTWIDTH] IN BLOOD BY AUTOMATED COUNT: 13.4 % (ref 11–15.5)
GFR SERPL CREATININE-BSD FRML MDRD: 61 ML/MIN (ref 60–?)
GLUCOSE SERPL-MCNC: 283 MG/DL (ref 70–105)
HCT VFR BLD AUTO: 38.9 % (ref 42–54)
LYMPHOCYTES NFR SPEC AUTO: 27.4 % (ref 21–51)
MCH RBC QN AUTO: 25.4 PG (ref 27–33)
MCHC RBC AUTO-ENTMCNC: 32.1 G/DL (ref 32–36)
MCV RBC AUTO: 79.1 FL (ref 79–99)
MONOCYTES NFR BLD AUTO: 7.1 % (ref 3–13)
NEUTROPHILS NFR BLD AUTO: 60 % (ref 40–77)
NRBC BLD MANUAL-RTO: 0 % (ref 0–0.19)
PLATELET # BLD AUTO: 360 K/UL (ref 130–400)
POTASSIUM SERPL-SCNC: 4 MMOL/L (ref 3.5–5.1)
PROT SERPL-MCNC: 8.4 G/DL (ref 6–8.3)
RBC # BLD AUTO: 4.92 MIL/UL (ref 4.5–6.2)
SODIUM SERPL-SCNC: 137 MMOL/L (ref 136–145)
WBC # BLD AUTO: 10.5 K/UL (ref 4.8–10.8)

## 2020-12-07 PROCEDURE — 84145 PROCALCITONIN (PCT): CPT

## 2020-12-07 PROCEDURE — 96374 THER/PROPH/DIAG INJ IV PUSH: CPT

## 2020-12-07 PROCEDURE — 96376 TX/PRO/DX INJ SAME DRUG ADON: CPT

## 2020-12-07 PROCEDURE — 36415 COLL VENOUS BLD VENIPUNCTURE: CPT

## 2020-12-07 PROCEDURE — 96361 HYDRATE IV INFUSION ADD-ON: CPT

## 2020-12-07 PROCEDURE — 73630 X-RAY EXAM OF FOOT: CPT

## 2020-12-07 PROCEDURE — 99284 EMERGENCY DEPT VISIT MOD MDM: CPT

## 2020-12-07 PROCEDURE — 85025 COMPLETE CBC W/AUTO DIFF WBC: CPT

## 2020-12-07 PROCEDURE — 80053 COMPREHEN METABOLIC PANEL: CPT

## 2020-12-21 ENCOUNTER — HOSPITAL ENCOUNTER (EMERGENCY)
Dept: HOSPITAL 90 - EDH | Age: 35
Discharge: HOME | End: 2020-12-21
Payer: MEDICAID

## 2020-12-21 DIAGNOSIS — E11.9: ICD-10-CM

## 2020-12-21 DIAGNOSIS — Z20.828: ICD-10-CM

## 2020-12-21 DIAGNOSIS — Z79.899: ICD-10-CM

## 2020-12-21 DIAGNOSIS — L03.115: ICD-10-CM

## 2020-12-21 DIAGNOSIS — B34.9: Primary | ICD-10-CM

## 2020-12-21 DIAGNOSIS — Z88.2: ICD-10-CM

## 2020-12-21 DIAGNOSIS — I10: ICD-10-CM

## 2020-12-21 DIAGNOSIS — E78.5: ICD-10-CM

## 2020-12-21 LAB
ALBUMIN SERPL-MCNC: 3.6 G/DL (ref 3.5–5)
ALT SERPL-CCNC: 39 U/L (ref 12–78)
APTT PPP: 27 SEC (ref 26.3–35.5)
AST SERPL-CCNC: 26 U/L (ref 10–37)
BASOPHILS NFR BLD AUTO: 0.6 % (ref 0–5)
BILIRUB SERPL-MCNC: 0.4 MG/DL (ref 0.2–1)
BUN SERPL-MCNC: 33 MG/DL (ref 7–18)
CHLORIDE SERPL-SCNC: 101 MMOL/L (ref 101–111)
CK SERPL-CCNC: 131 U/L (ref 21–232)
CO2 SERPL-SCNC: 25 MMOL/L (ref 21–32)
CREAT SERPL-MCNC: 1.1 MG/DL (ref 0.5–1.5)
EOSINOPHIL NFR BLD AUTO: 5.2 % (ref 0–8)
ERYTHROCYTE [DISTWIDTH] IN BLOOD BY AUTOMATED COUNT: 13.7 % (ref 11–15.5)
GFR SERPL CREATININE-BSD FRML MDRD: 80 ML/MIN (ref 60–?)
GLUCOSE SERPL-MCNC: 184 MG/DL (ref 70–105)
HCT VFR BLD AUTO: 37.8 % (ref 42–54)
INR PPP: 1.05 (ref 0.85–1.15)
LYMPHOCYTES NFR SPEC AUTO: 28.1 % (ref 21–51)
MCH RBC QN AUTO: 25.7 PG (ref 27–33)
MCHC RBC AUTO-ENTMCNC: 32 G/DL (ref 32–36)
MCV RBC AUTO: 80.3 FL (ref 79–99)
MONOCYTES NFR BLD AUTO: 8.5 % (ref 3–13)
NEUTROPHILS NFR BLD AUTO: 56.7 % (ref 40–77)
NRBC BLD MANUAL-RTO: 0 % (ref 0–0.19)
PLATELET # BLD AUTO: 295 K/UL (ref 130–400)
POTASSIUM SERPL-SCNC: 3.9 MMOL/L (ref 3.5–5.1)
PROT SERPL-MCNC: 7.7 G/DL (ref 6–8.3)
PROTHROMBIN TIME: 11.2 SEC (ref 9.6–11.6)
RBC # BLD AUTO: 4.71 MIL/UL (ref 4.5–6.2)
SODIUM SERPL-SCNC: 138 MMOL/L (ref 136–145)
WBC # BLD AUTO: 9.4 K/UL (ref 4.8–10.8)

## 2020-12-21 PROCEDURE — 73630 X-RAY EXAM OF FOOT: CPT

## 2020-12-21 PROCEDURE — 85730 THROMBOPLASTIN TIME PARTIAL: CPT

## 2020-12-21 PROCEDURE — 82550 ASSAY OF CK (CPK): CPT

## 2020-12-21 PROCEDURE — 96374 THER/PROPH/DIAG INJ IV PUSH: CPT

## 2020-12-21 PROCEDURE — 71045 X-RAY EXAM CHEST 1 VIEW: CPT

## 2020-12-21 PROCEDURE — 96375 TX/PRO/DX INJ NEW DRUG ADDON: CPT

## 2020-12-21 PROCEDURE — 99285 EMERGENCY DEPT VISIT HI MDM: CPT

## 2020-12-21 PROCEDURE — 84484 ASSAY OF TROPONIN QUANT: CPT

## 2020-12-21 PROCEDURE — 85610 PROTHROMBIN TIME: CPT

## 2020-12-21 PROCEDURE — 36415 COLL VENOUS BLD VENIPUNCTURE: CPT

## 2020-12-21 PROCEDURE — 87426 SARSCOV CORONAVIRUS AG IA: CPT

## 2020-12-21 PROCEDURE — 87040 BLOOD CULTURE FOR BACTERIA: CPT

## 2020-12-21 PROCEDURE — 85025 COMPLETE CBC W/AUTO DIFF WBC: CPT

## 2020-12-21 PROCEDURE — 83605 ASSAY OF LACTIC ACID: CPT

## 2020-12-21 PROCEDURE — 87804 INFLUENZA ASSAY W/OPTIC: CPT

## 2020-12-21 PROCEDURE — 93005 ELECTROCARDIOGRAM TRACING: CPT

## 2020-12-21 PROCEDURE — 80053 COMPREHEN METABOLIC PANEL: CPT

## 2020-12-27 ENCOUNTER — HOSPITAL ENCOUNTER (INPATIENT)
Dept: HOSPITAL 90 - EDH | Age: 35
LOS: 6 days | Discharge: HOME | DRG: 137 | End: 2021-01-02
Attending: FAMILY MEDICINE | Admitting: FAMILY MEDICINE
Payer: MEDICAID

## 2020-12-27 VITALS — HEIGHT: 75 IN | BODY MASS INDEX: 38.05 KG/M2 | WEIGHT: 306 LBS

## 2020-12-27 DIAGNOSIS — Z88.2: ICD-10-CM

## 2020-12-27 DIAGNOSIS — Z82.49: ICD-10-CM

## 2020-12-27 DIAGNOSIS — N17.9: ICD-10-CM

## 2020-12-27 DIAGNOSIS — N12: ICD-10-CM

## 2020-12-27 DIAGNOSIS — Z82.5: ICD-10-CM

## 2020-12-27 DIAGNOSIS — U07.1: Primary | ICD-10-CM

## 2020-12-27 DIAGNOSIS — E78.5: ICD-10-CM

## 2020-12-27 DIAGNOSIS — Z89.431: ICD-10-CM

## 2020-12-27 DIAGNOSIS — E11.51: ICD-10-CM

## 2020-12-27 DIAGNOSIS — D68.69: ICD-10-CM

## 2020-12-27 DIAGNOSIS — J12.82: ICD-10-CM

## 2020-12-27 DIAGNOSIS — I10: ICD-10-CM

## 2020-12-27 DIAGNOSIS — Z83.3: ICD-10-CM

## 2020-12-27 DIAGNOSIS — Z90.49: ICD-10-CM

## 2020-12-27 DIAGNOSIS — J96.01: ICD-10-CM

## 2020-12-27 DIAGNOSIS — Z86.718: ICD-10-CM

## 2020-12-27 DIAGNOSIS — Z79.01: ICD-10-CM

## 2020-12-27 DIAGNOSIS — E66.9: ICD-10-CM

## 2020-12-27 DIAGNOSIS — Z88.8: ICD-10-CM

## 2020-12-27 DIAGNOSIS — Z87.891: ICD-10-CM

## 2020-12-27 LAB
ALBUMIN SERPL-MCNC: 4 G/DL (ref 3.5–5)
ALT SERPL-CCNC: 58 U/L (ref 12–78)
APTT PPP: 31.9 SEC (ref 26.3–35.5)
AST SERPL-CCNC: 53 U/L (ref 10–37)
BASE EXCESS BLDA CALC-SCNC: -1 MMOL/L (ref -2–3)
BASOPHILS NFR BLD AUTO: 0.1 % (ref 0–5)
BILIRUB SERPL-MCNC: 0.5 MG/DL (ref 0.2–1)
BUN SERPL-MCNC: 33 MG/DL (ref 7–18)
CHLORIDE SERPL-SCNC: 93 MMOL/L (ref 101–111)
CHOLEST SERPL-MCNC: 137 MG/DL (ref ?–200)
CO2 SERPL-SCNC: 25 MMOL/L (ref 21–32)
CREAT SERPL-MCNC: 1.6 MG/DL (ref 0.5–1.5)
CRP SERPL HS-MCNC: 20.9 MG/L (ref 0–9)
EOSINOPHIL NFR BLD AUTO: 0.3 % (ref 0–8)
ERYTHROCYTE [DISTWIDTH] IN BLOOD BY AUTOMATED COUNT: 14.2 % (ref 11–15.5)
GFR SERPL CREATININE-BSD FRML MDRD: 52 ML/MIN (ref 60–?)
GLUCOSE SERPL-MCNC: 294 MG/DL (ref 70–105)
GLUCOSE UR STRIP-MCNC: >=1000 MG/DL
HCO3 BLDA-SCNC: 23.3 MMOL/L (ref 21–28)
HCT VFR BLD AUTO: 40.1 % (ref 42–54)
HDLC SERPL-MCNC: 93 MG/DL (ref 29–71)
INR PPP: 1.06 (ref 0.85–1.15)
LDH SERPL-CCNC: 220 U/L (ref 81–234)
LDLC SERPL CALC-MCNC: 67 MG/DL (ref 0–99)
LYMPHOCYTES NFR SPEC AUTO: 14.8 % (ref 21–51)
MCH RBC QN AUTO: 25.2 PG (ref 27–33)
MCHC RBC AUTO-ENTMCNC: 32.4 G/DL (ref 32–36)
MCV RBC AUTO: 77.7 FL (ref 79–99)
MONOCYTES NFR BLD AUTO: 7.9 % (ref 3–13)
NEUTROPHILS NFR BLD AUTO: 76.4 % (ref 40–77)
NRBC BLD MANUAL-RTO: 0 % (ref 0–0.19)
PCO2 BLDA: 38 MMHG (ref 35–48)
PH UR STRIP: 5 [PH] (ref 5–8)
PLATELET # BLD AUTO: 216 K/UL (ref 130–400)
POTASSIUM SERPL-SCNC: 4.5 MMOL/L (ref 3.5–5.1)
PROT SERPL-MCNC: 8.2 G/DL (ref 6–8.3)
PROT UR QL STRIP: (no result) MG/DL
PROTHROMBIN TIME: 11.3 SEC (ref 9.6–11.6)
RBC # BLD AUTO: 5.16 MIL/UL (ref 4.5–6.2)
RBC #/AREA URNS HPF: (no result) /HPF (ref 0–1)
SAO2 % BLDA: 97.9 % (ref 95–99)
SODIUM SERPL-SCNC: 131 MMOL/L (ref 136–145)
SP GR UR STRIP: 1.03 (ref 1–1.03)
TRIGL SERPL-MCNC: 253 MG/DL (ref 30–200)
UROBILINOGEN UR STRIP-MCNC: 0.2 MG/DL (ref 0.2–1)
WBC # BLD AUTO: 7.7 K/UL (ref 4.8–10.8)
WBC #/AREA URNS HPF: (no result) /HPF (ref 0–1)

## 2020-12-27 PROCEDURE — 82010 KETONE BODYS QUAN: CPT

## 2020-12-27 PROCEDURE — 82803 BLOOD GASES ANY COMBINATION: CPT

## 2020-12-27 PROCEDURE — 36415 COLL VENOUS BLD VENIPUNCTURE: CPT

## 2020-12-27 PROCEDURE — 84484 ASSAY OF TROPONIN QUANT: CPT

## 2020-12-27 PROCEDURE — 80076 HEPATIC FUNCTION PANEL: CPT

## 2020-12-27 PROCEDURE — 81001 URINALYSIS AUTO W/SCOPE: CPT

## 2020-12-27 PROCEDURE — 86901 BLOOD TYPING SEROLOGIC RH(D): CPT

## 2020-12-27 PROCEDURE — 83036 HEMOGLOBIN GLYCOSYLATED A1C: CPT

## 2020-12-27 PROCEDURE — 85025 COMPLETE CBC W/AUTO DIFF WBC: CPT

## 2020-12-27 PROCEDURE — 36600 WITHDRAWAL OF ARTERIAL BLOOD: CPT

## 2020-12-27 PROCEDURE — 85730 THROMBOPLASTIN TIME PARTIAL: CPT

## 2020-12-27 PROCEDURE — 85378 FIBRIN DEGRADE SEMIQUANT: CPT

## 2020-12-27 PROCEDURE — 84145 PROCALCITONIN (PCT): CPT

## 2020-12-27 PROCEDURE — 82728 ASSAY OF FERRITIN: CPT

## 2020-12-27 PROCEDURE — 83605 ASSAY OF LACTIC ACID: CPT

## 2020-12-27 PROCEDURE — 85610 PROTHROMBIN TIME: CPT

## 2020-12-27 PROCEDURE — 74176 CT ABD & PELVIS W/O CONTRAST: CPT

## 2020-12-27 PROCEDURE — 86900 BLOOD TYPING SEROLOGIC ABO: CPT

## 2020-12-27 PROCEDURE — 87186 SC STD MICRODIL/AGAR DIL: CPT

## 2020-12-27 PROCEDURE — 82948 REAGENT STRIP/BLOOD GLUCOSE: CPT

## 2020-12-27 PROCEDURE — 80053 COMPREHEN METABOLIC PANEL: CPT

## 2020-12-27 PROCEDURE — 71045 X-RAY EXAM CHEST 1 VIEW: CPT

## 2020-12-27 PROCEDURE — 87040 BLOOD CULTURE FOR BACTERIA: CPT

## 2020-12-27 PROCEDURE — 87426 SARSCOV CORONAVIRUS AG IA: CPT

## 2020-12-27 PROCEDURE — 93970 EXTREMITY STUDY: CPT

## 2020-12-27 PROCEDURE — 86850 RBC ANTIBODY SCREEN: CPT

## 2020-12-27 PROCEDURE — 86140 C-REACTIVE PROTEIN: CPT

## 2020-12-27 PROCEDURE — 99291 CRITICAL CARE FIRST HOUR: CPT

## 2020-12-27 PROCEDURE — 82248 BILIRUBIN DIRECT: CPT

## 2020-12-27 PROCEDURE — 87088 URINE BACTERIA CULTURE: CPT

## 2020-12-27 PROCEDURE — 83615 LACTATE (LD) (LDH) ENZYME: CPT

## 2020-12-27 PROCEDURE — 86927 PLASMA FRESH FROZEN: CPT

## 2020-12-27 PROCEDURE — 80061 LIPID PANEL: CPT

## 2020-12-27 PROCEDURE — 36430 TRANSFUSION BLD/BLD COMPNT: CPT

## 2020-12-27 PROCEDURE — 87077 CULTURE AEROBIC IDENTIFY: CPT

## 2020-12-28 LAB
ALBUMIN SERPL-MCNC: 3.3 G/DL (ref 3.5–5)
ALT SERPL-CCNC: 40 U/L (ref 12–78)
AST SERPL-CCNC: 42 U/L (ref 10–37)
BASOPHILS NFR BLD AUTO: 0.5 % (ref 0–5)
BILIRUB SERPL-MCNC: 0.3 MG/DL (ref 0.2–1)
BUN SERPL-MCNC: 34 MG/DL (ref 7–18)
CHLORIDE SERPL-SCNC: 98 MMOL/L (ref 101–111)
CO2 SERPL-SCNC: 26 MMOL/L (ref 21–32)
CREAT SERPL-MCNC: 1.4 MG/DL (ref 0.5–1.5)
CRP SERPL HS-MCNC: 22.3 MG/L (ref 0–9)
EOSINOPHIL NFR BLD AUTO: 0.5 % (ref 0–8)
ERYTHROCYTE [DISTWIDTH] IN BLOOD BY AUTOMATED COUNT: 14.5 % (ref 11–15.5)
GFR SERPL CREATININE-BSD FRML MDRD: 61 ML/MIN (ref 60–?)
GLUCOSE SERPL-MCNC: 187 MG/DL (ref 70–105)
HCT VFR BLD AUTO: 37.3 % (ref 42–54)
LDH SERPL-CCNC: 216 U/L (ref 81–234)
LYMPHOCYTES NFR SPEC AUTO: 31.1 % (ref 21–51)
MCH RBC QN AUTO: 25.4 PG (ref 27–33)
MCHC RBC AUTO-ENTMCNC: 31.9 G/DL (ref 32–36)
MCV RBC AUTO: 79.7 FL (ref 79–99)
MONOCYTES NFR BLD AUTO: 11 % (ref 3–13)
NEUTROPHILS NFR BLD AUTO: 56.4 % (ref 40–77)
NRBC BLD MANUAL-RTO: 0 % (ref 0–0.19)
PLATELET # BLD AUTO: 181 K/UL (ref 130–400)
POTASSIUM SERPL-SCNC: 3.9 MMOL/L (ref 3.5–5.1)
PROT SERPL-MCNC: 7.8 G/DL (ref 6–8.3)
RBC # BLD AUTO: 4.68 MIL/UL (ref 4.5–6.2)
SODIUM SERPL-SCNC: 136 MMOL/L (ref 136–145)
WBC # BLD AUTO: 6 K/UL (ref 4.8–10.8)

## 2020-12-28 RX ADMIN — Medication SCH MG: at 09:00

## 2020-12-28 RX ADMIN — OXYCODONE HYDROCHLORIDE AND ACETAMINOPHEN SCH MG: 500 TABLET ORAL at 09:00

## 2020-12-29 VITALS — DIASTOLIC BLOOD PRESSURE: 83 MMHG | SYSTOLIC BLOOD PRESSURE: 155 MMHG

## 2020-12-29 LAB
ALBUMIN SERPL-MCNC: 3 G/DL (ref 3.5–5)
ALT SERPL-CCNC: 45 U/L (ref 12–78)
AST SERPL-CCNC: 36 U/L (ref 10–37)
BASOPHILS NFR BLD AUTO: 0.2 % (ref 0–5)
BILIRUB SERPL-MCNC: 0.3 MG/DL (ref 0.2–1)
BUN SERPL-MCNC: 38 MG/DL (ref 7–18)
CHLORIDE SERPL-SCNC: 99 MMOL/L (ref 101–111)
CO2 SERPL-SCNC: 26 MMOL/L (ref 21–32)
CREAT SERPL-MCNC: 1.5 MG/DL (ref 0.5–1.5)
CRP SERPL HS-MCNC: 21.5 MG/L (ref 0–9)
EOSINOPHIL NFR BLD AUTO: 0 % (ref 0–8)
ERYTHROCYTE [DISTWIDTH] IN BLOOD BY AUTOMATED COUNT: 14.5 % (ref 11–15.5)
GFR SERPL CREATININE-BSD FRML MDRD: 56 ML/MIN (ref 60–?)
GLUCOSE SERPL-MCNC: 249 MG/DL (ref 70–105)
HCT VFR BLD AUTO: 36.3 % (ref 42–54)
LDH SERPL-CCNC: 189 U/L (ref 81–234)
LYMPHOCYTES NFR SPEC AUTO: 36.2 % (ref 21–51)
MCH RBC QN AUTO: 25.2 PG (ref 27–33)
MCHC RBC AUTO-ENTMCNC: 31.7 G/DL (ref 32–36)
MCV RBC AUTO: 79.6 FL (ref 79–99)
MONOCYTES NFR BLD AUTO: 9.4 % (ref 3–13)
NEUTROPHILS NFR BLD AUTO: 53.8 % (ref 40–77)
NRBC BLD MANUAL-RTO: 0 % (ref 0–0.19)
PLATELET # BLD AUTO: 181 K/UL (ref 130–400)
POTASSIUM SERPL-SCNC: 4.1 MMOL/L (ref 3.5–5.1)
PROT SERPL-MCNC: 7.3 G/DL (ref 6–8.3)
RBC # BLD AUTO: 4.56 MIL/UL (ref 4.5–6.2)
SODIUM SERPL-SCNC: 135 MMOL/L (ref 136–145)
WBC # BLD AUTO: 5.1 K/UL (ref 4.8–10.8)

## 2020-12-29 RX ADMIN — EZETIMIBE SCH MG: 10 TABLET ORAL at 09:00

## 2020-12-29 RX ADMIN — Medication SCH MG: at 09:00

## 2020-12-29 RX ADMIN — INSULIN LISPRO SCH UNIT: 100 INJECTION, SOLUTION INTRAVENOUS; SUBCUTANEOUS at 17:00

## 2020-12-29 RX ADMIN — INSULIN LISPRO SCH UNIT: 100 INJECTION, SOLUTION INTRAVENOUS; SUBCUTANEOUS at 12:00

## 2020-12-29 RX ADMIN — HYDROCHLOROTHIAZIDE SCH MG: 25 TABLET ORAL at 09:00

## 2020-12-29 RX ADMIN — OXYCODONE HYDROCHLORIDE AND ACETAMINOPHEN SCH MG: 500 TABLET ORAL at 09:00

## 2020-12-30 VITALS — SYSTOLIC BLOOD PRESSURE: 130 MMHG | DIASTOLIC BLOOD PRESSURE: 75 MMHG

## 2020-12-30 VITALS — SYSTOLIC BLOOD PRESSURE: 137 MMHG | DIASTOLIC BLOOD PRESSURE: 72 MMHG

## 2020-12-30 VITALS — DIASTOLIC BLOOD PRESSURE: 75 MMHG | SYSTOLIC BLOOD PRESSURE: 161 MMHG

## 2020-12-30 VITALS — SYSTOLIC BLOOD PRESSURE: 125 MMHG | DIASTOLIC BLOOD PRESSURE: 78 MMHG

## 2020-12-30 VITALS — SYSTOLIC BLOOD PRESSURE: 125 MMHG | DIASTOLIC BLOOD PRESSURE: 84 MMHG

## 2020-12-30 VITALS — SYSTOLIC BLOOD PRESSURE: 131 MMHG | DIASTOLIC BLOOD PRESSURE: 80 MMHG

## 2020-12-30 LAB
ALBUMIN SERPL-MCNC: 3 G/DL (ref 3.5–5)
ALT SERPL-CCNC: 44 U/L (ref 12–78)
AST SERPL-CCNC: 40 U/L (ref 10–37)
BASOPHILS NFR BLD AUTO: 0.2 % (ref 0–5)
BILIRUB SERPL-MCNC: 0.3 MG/DL (ref 0.2–1)
BUN SERPL-MCNC: 36 MG/DL (ref 7–18)
CHLORIDE SERPL-SCNC: 98 MMOL/L (ref 101–111)
CO2 SERPL-SCNC: 24 MMOL/L (ref 21–32)
CREAT SERPL-MCNC: 1.3 MG/DL (ref 0.5–1.5)
CRP SERPL HS-MCNC: 29.8 MG/L (ref 0–9)
EOSINOPHIL NFR BLD AUTO: 0.2 % (ref 0–8)
ERYTHROCYTE [DISTWIDTH] IN BLOOD BY AUTOMATED COUNT: 14.2 % (ref 11–15.5)
GFR SERPL CREATININE-BSD FRML MDRD: 66 ML/MIN (ref 60–?)
GLUCOSE SERPL-MCNC: 232 MG/DL (ref 70–105)
HBA1C MFR BLD: 9.1 % (ref 4–6)
HCT VFR BLD AUTO: 35.1 % (ref 42–54)
LDH SERPL-CCNC: 228 U/L (ref 81–234)
LYMPHOCYTES NFR SPEC AUTO: 25.2 % (ref 21–51)
MCH RBC QN AUTO: 25.2 PG (ref 27–33)
MCHC RBC AUTO-ENTMCNC: 31.6 G/DL (ref 32–36)
MCV RBC AUTO: 79.8 FL (ref 79–99)
MONOCYTES NFR BLD AUTO: 7.3 % (ref 3–13)
NEUTROPHILS NFR BLD AUTO: 66.6 % (ref 40–77)
NRBC BLD MANUAL-RTO: 0 % (ref 0–0.19)
PLATELET # BLD AUTO: 177 K/UL (ref 130–400)
POTASSIUM SERPL-SCNC: 4 MMOL/L (ref 3.5–5.1)
PROT SERPL-MCNC: 7.4 G/DL (ref 6–8.3)
RBC # BLD AUTO: 4.4 MIL/UL (ref 4.5–6.2)
SODIUM SERPL-SCNC: 134 MMOL/L (ref 136–145)
WBC # BLD AUTO: 5.5 K/UL (ref 4.8–10.8)

## 2020-12-30 PROCEDURE — XW033E5 INTRODUCTION OF REMDESIVIR ANTI-INFECTIVE INTO PERIPHERAL VEIN, PERCUTANEOUS APPROACH, NEW TECHNOLOGY GROUP 5: ICD-10-PCS | Performed by: INTERNAL MEDICINE

## 2020-12-30 RX ADMIN — FAMOTIDINE SCH MG: 10 INJECTION INTRAVENOUS at 22:57

## 2020-12-30 RX ADMIN — FAMOTIDINE SCH MG: 10 INJECTION INTRAVENOUS at 00:47

## 2020-12-30 RX ADMIN — ATORVASTATIN CALCIUM SCH MG: 40 TABLET, FILM COATED ORAL at 00:46

## 2020-12-30 RX ADMIN — SODIUM CHLORIDE SCH GM: 9 INJECTION, SOLUTION INTRAVENOUS at 00:48

## 2020-12-30 RX ADMIN — INSULIN GLARGINE SCH UNITS: 100 INJECTION, SOLUTION SUBCUTANEOUS at 23:00

## 2020-12-30 RX ADMIN — INSULIN LISPRO SCH UNIT: 100 INJECTION, SOLUTION INTRAVENOUS; SUBCUTANEOUS at 08:00

## 2020-12-30 RX ADMIN — AZITHROMYCIN MONOHYDRATE SCH MLS/HR: 500 INJECTION, POWDER, LYOPHILIZED, FOR SOLUTION INTRAVENOUS at 00:55

## 2020-12-30 RX ADMIN — APIXABAN SCH MG: 5 TABLET, FILM COATED ORAL at 22:52

## 2020-12-30 RX ADMIN — INSULIN LISPRO SCH UNIT: 100 INJECTION, SOLUTION INTRAVENOUS; SUBCUTANEOUS at 16:26

## 2020-12-30 RX ADMIN — GABAPENTIN SCH MG: 100 CAPSULE ORAL at 14:06

## 2020-12-30 RX ADMIN — INSULIN LISPRO SCH UNIT: 100 INJECTION, SOLUTION INTRAVENOUS; SUBCUTANEOUS at 11:51

## 2020-12-30 RX ADMIN — APIXABAN SCH MG: 5 TABLET, FILM COATED ORAL at 00:45

## 2020-12-30 RX ADMIN — EZETIMIBE SCH MG: 10 TABLET ORAL at 07:57

## 2020-12-30 RX ADMIN — SODIUM CHLORIDE SCH UNIT: 9 INJECTION, SOLUTION INTRAVENOUS at 06:33

## 2020-12-30 RX ADMIN — SODIUM CHLORIDE SCH UNIT: 9 INJECTION, SOLUTION INTRAVENOUS at 00:52

## 2020-12-30 RX ADMIN — FAMOTIDINE SCH MG: 10 INJECTION INTRAVENOUS at 07:58

## 2020-12-30 RX ADMIN — AZITHROMYCIN MONOHYDRATE SCH MLS/HR: 500 INJECTION, POWDER, LYOPHILIZED, FOR SOLUTION INTRAVENOUS at 22:57

## 2020-12-30 RX ADMIN — GABAPENTIN SCH MG: 100 CAPSULE ORAL at 22:53

## 2020-12-30 RX ADMIN — SODIUM CHLORIDE SCH UNIT: 9 INJECTION, SOLUTION INTRAVENOUS at 11:50

## 2020-12-30 RX ADMIN — Medication SCH MG: at 07:58

## 2020-12-30 RX ADMIN — INSULIN GLARGINE SCH UNITS: 100 INJECTION, SOLUTION SUBCUTANEOUS at 00:50

## 2020-12-30 RX ADMIN — OXYCODONE HYDROCHLORIDE AND ACETAMINOPHEN SCH MG: 500 TABLET ORAL at 07:58

## 2020-12-30 RX ADMIN — SODIUM CHLORIDE SCH UNIT: 9 INJECTION, SOLUTION INTRAVENOUS at 16:27

## 2020-12-30 RX ADMIN — SODIUM CHLORIDE SCH GM: 9 INJECTION, SOLUTION INTRAVENOUS at 22:57

## 2020-12-30 RX ADMIN — ACETAMINOPHEN PRN MG: 325 TABLET, FILM COATED ORAL at 22:55

## 2020-12-30 RX ADMIN — APIXABAN SCH MG: 5 TABLET, FILM COATED ORAL at 07:57

## 2020-12-30 RX ADMIN — ATORVASTATIN CALCIUM SCH MG: 40 TABLET, FILM COATED ORAL at 22:53

## 2020-12-30 RX ADMIN — HYDROCHLOROTHIAZIDE SCH MG: 25 TABLET ORAL at 07:57

## 2020-12-31 VITALS — SYSTOLIC BLOOD PRESSURE: 122 MMHG | DIASTOLIC BLOOD PRESSURE: 69 MMHG

## 2020-12-31 VITALS — DIASTOLIC BLOOD PRESSURE: 77 MMHG | SYSTOLIC BLOOD PRESSURE: 130 MMHG

## 2020-12-31 VITALS — DIASTOLIC BLOOD PRESSURE: 87 MMHG | SYSTOLIC BLOOD PRESSURE: 129 MMHG

## 2020-12-31 VITALS — SYSTOLIC BLOOD PRESSURE: 139 MMHG | DIASTOLIC BLOOD PRESSURE: 80 MMHG

## 2020-12-31 VITALS — SYSTOLIC BLOOD PRESSURE: 144 MMHG | DIASTOLIC BLOOD PRESSURE: 86 MMHG

## 2020-12-31 VITALS — SYSTOLIC BLOOD PRESSURE: 138 MMHG | DIASTOLIC BLOOD PRESSURE: 78 MMHG

## 2020-12-31 VITALS — DIASTOLIC BLOOD PRESSURE: 53 MMHG | SYSTOLIC BLOOD PRESSURE: 110 MMHG

## 2020-12-31 LAB
ALBUMIN SERPL-MCNC: 2.9 G/DL (ref 3.5–5)
ALT SERPL-CCNC: 41 U/L (ref 12–78)
AST SERPL-CCNC: 40 U/L (ref 10–37)
BASOPHILS NFR BLD AUTO: 0.2 % (ref 0–5)
BILIRUB DIRECT SERPL-MCNC: 0.1 MG/DL (ref 0–0.3)
BILIRUB SERPL-MCNC: 0.3 MG/DL (ref 0.2–1)
BUN SERPL-MCNC: 34 MG/DL (ref 7–18)
CHLORIDE SERPL-SCNC: 98 MMOL/L (ref 101–111)
CO2 SERPL-SCNC: 25 MMOL/L (ref 21–32)
CREAT SERPL-MCNC: 1.3 MG/DL (ref 0.5–1.5)
CRP SERPL HS-MCNC: 59.2 MG/L (ref 0–9)
EOSINOPHIL NFR BLD AUTO: 0.3 % (ref 0–8)
ERYTHROCYTE [DISTWIDTH] IN BLOOD BY AUTOMATED COUNT: 14.2 % (ref 11–15.5)
GFR SERPL CREATININE-BSD FRML MDRD: 66 ML/MIN (ref 60–?)
GLUCOSE SERPL-MCNC: 247 MG/DL (ref 70–105)
HCT VFR BLD AUTO: 35.2 % (ref 42–54)
LYMPHOCYTES NFR SPEC AUTO: 33.6 % (ref 21–51)
MCH RBC QN AUTO: 24.7 PG (ref 27–33)
MCHC RBC AUTO-ENTMCNC: 31.3 G/DL (ref 32–36)
MCV RBC AUTO: 79.1 FL (ref 79–99)
MONOCYTES NFR BLD AUTO: 5.9 % (ref 3–13)
NEUTROPHILS NFR BLD AUTO: 59.4 % (ref 40–77)
NRBC BLD MANUAL-RTO: 0 % (ref 0–0.19)
PLATELET # BLD AUTO: 180 K/UL (ref 130–400)
POTASSIUM SERPL-SCNC: 4.1 MMOL/L (ref 3.5–5.1)
PROT SERPL-MCNC: 7.4 G/DL (ref 6–8.3)
RBC # BLD AUTO: 4.45 MIL/UL (ref 4.5–6.2)
RBC MORPH BLD: (no result)
SODIUM SERPL-SCNC: 135 MMOL/L (ref 136–145)
WBC # BLD AUTO: 6.3 K/UL (ref 4.8–10.8)

## 2020-12-31 PROCEDURE — XW13325 TRANSFUSION OF CONVALESCENT PLASMA (NONAUTOLOGOUS) INTO PERIPHERAL VEIN, PERCUTANEOUS APPROACH, NEW TECHNOLOGY GROUP 5: ICD-10-PCS | Performed by: INTERNAL MEDICINE

## 2020-12-31 RX ADMIN — SODIUM CHLORIDE SCH UNIT: 9 INJECTION, SOLUTION INTRAVENOUS at 22:58

## 2020-12-31 RX ADMIN — EZETIMIBE SCH MG: 10 TABLET ORAL at 09:28

## 2020-12-31 RX ADMIN — OXYCODONE HYDROCHLORIDE AND ACETAMINOPHEN SCH MG: 500 TABLET ORAL at 09:27

## 2020-12-31 RX ADMIN — GABAPENTIN SCH MG: 100 CAPSULE ORAL at 22:54

## 2020-12-31 RX ADMIN — ATORVASTATIN CALCIUM SCH MG: 40 TABLET, FILM COATED ORAL at 22:56

## 2020-12-31 RX ADMIN — GABAPENTIN SCH MG: 100 CAPSULE ORAL at 14:18

## 2020-12-31 RX ADMIN — Medication SCH MG: at 09:28

## 2020-12-31 RX ADMIN — FAMOTIDINE SCH MG: 10 INJECTION INTRAVENOUS at 22:55

## 2020-12-31 RX ADMIN — INSULIN LISPRO SCH UNIT: 100 INJECTION, SOLUTION INTRAVENOUS; SUBCUTANEOUS at 09:33

## 2020-12-31 RX ADMIN — GABAPENTIN SCH MG: 100 CAPSULE ORAL at 09:28

## 2020-12-31 RX ADMIN — APIXABAN SCH MG: 5 TABLET, FILM COATED ORAL at 22:55

## 2020-12-31 RX ADMIN — DEXAMETHASONE SODIUM PHOSPHATE SCH MG: 4 INJECTION, SOLUTION INTRAMUSCULAR; INTRAVENOUS at 09:30

## 2020-12-31 RX ADMIN — SODIUM CHLORIDE SCH UNIT: 9 INJECTION, SOLUTION INTRAVENOUS at 11:59

## 2020-12-31 RX ADMIN — SODIUM CHLORIDE SCH UNIT: 9 INJECTION, SOLUTION INTRAVENOUS at 16:23

## 2020-12-31 RX ADMIN — SODIUM CHLORIDE SCH UNIT: 9 INJECTION, SOLUTION INTRAVENOUS at 16:32

## 2020-12-31 RX ADMIN — SODIUM CHLORIDE SCH UNIT: 9 INJECTION, SOLUTION INTRAVENOUS at 06:49

## 2020-12-31 RX ADMIN — INSULIN LISPRO SCH UNIT: 100 INJECTION, SOLUTION INTRAVENOUS; SUBCUTANEOUS at 12:00

## 2020-12-31 RX ADMIN — ACETAMINOPHEN PRN MG: 325 TABLET, FILM COATED ORAL at 23:04

## 2020-12-31 RX ADMIN — INSULIN LISPRO SCH UNIT: 100 INJECTION, SOLUTION INTRAVENOUS; SUBCUTANEOUS at 16:29

## 2020-12-31 RX ADMIN — INSULIN GLARGINE SCH UNITS: 100 INJECTION, SOLUTION SUBCUTANEOUS at 23:00

## 2020-12-31 RX ADMIN — HYDROCHLOROTHIAZIDE SCH MG: 25 TABLET ORAL at 09:28

## 2020-12-31 RX ADMIN — SODIUM CHLORIDE SCH MLS/HR: 9 INJECTION, SOLUTION INTRAVENOUS at 15:07

## 2020-12-31 RX ADMIN — FAMOTIDINE SCH MG: 10 INJECTION INTRAVENOUS at 09:30

## 2021-01-01 VITALS — SYSTOLIC BLOOD PRESSURE: 141 MMHG | DIASTOLIC BLOOD PRESSURE: 85 MMHG

## 2021-01-01 VITALS — DIASTOLIC BLOOD PRESSURE: 85 MMHG | SYSTOLIC BLOOD PRESSURE: 143 MMHG

## 2021-01-01 VITALS — DIASTOLIC BLOOD PRESSURE: 94 MMHG | SYSTOLIC BLOOD PRESSURE: 149 MMHG

## 2021-01-01 VITALS — DIASTOLIC BLOOD PRESSURE: 80 MMHG | SYSTOLIC BLOOD PRESSURE: 145 MMHG

## 2021-01-01 VITALS — DIASTOLIC BLOOD PRESSURE: 92 MMHG | SYSTOLIC BLOOD PRESSURE: 154 MMHG

## 2021-01-01 VITALS — DIASTOLIC BLOOD PRESSURE: 89 MMHG | SYSTOLIC BLOOD PRESSURE: 142 MMHG

## 2021-01-01 LAB
ALBUMIN SERPL-MCNC: 3 G/DL (ref 3.5–5)
ALT SERPL-CCNC: 37 U/L (ref 12–78)
AST SERPL-CCNC: 37 U/L (ref 10–37)
BASOPHILS NFR BLD AUTO: 0 % (ref 0–5)
BASOPHILS NFR BLD AUTO: 0.2 % (ref 0–5)
BILIRUB DIRECT SERPL-MCNC: 0.1 MG/DL (ref 0–0.3)
BILIRUB SERPL-MCNC: 0.3 MG/DL (ref 0.2–1)
BUN SERPL-MCNC: 38 MG/DL (ref 7–18)
CHLORIDE SERPL-SCNC: 99 MMOL/L (ref 101–111)
CO2 SERPL-SCNC: 25 MMOL/L (ref 21–32)
CREAT SERPL-MCNC: 1.1 MG/DL (ref 0.5–1.5)
CRP SERPL HS-MCNC: 60.1 MG/L (ref 0–9)
EOSINOPHIL NFR BLD AUTO: 0.1 % (ref 0–8)
EOSINOPHIL NFR BLD AUTO: 0.2 % (ref 0–8)
ERYTHROCYTE [DISTWIDTH] IN BLOOD BY AUTOMATED COUNT: 13.7 % (ref 11–15.5)
ERYTHROCYTE [DISTWIDTH] IN BLOOD BY AUTOMATED COUNT: 13.8 % (ref 11–15.5)
GFR SERPL CREATININE-BSD FRML MDRD: 80 ML/MIN (ref 60–?)
GLUCOSE SERPL-MCNC: 282 MG/DL (ref 70–105)
HCT VFR BLD AUTO: 35.6 % (ref 42–54)
HCT VFR BLD AUTO: 36.2 % (ref 42–54)
LYMPHOCYTES NFR SPEC AUTO: 18.1 % (ref 21–51)
LYMPHOCYTES NFR SPEC AUTO: 26.8 % (ref 21–51)
MCH RBC QN AUTO: 24.7 PG (ref 27–33)
MCH RBC QN AUTO: 24.8 PG (ref 27–33)
MCHC RBC AUTO-ENTMCNC: 31.8 G/DL (ref 32–36)
MCHC RBC AUTO-ENTMCNC: 32 G/DL (ref 32–36)
MCV RBC AUTO: 77.6 FL (ref 79–99)
MCV RBC AUTO: 77.8 FL (ref 79–99)
MONOCYTES NFR BLD AUTO: 6.7 % (ref 3–13)
MONOCYTES NFR BLD AUTO: 9.1 % (ref 3–13)
NEUTROPHILS NFR BLD AUTO: 63.1 % (ref 40–77)
NEUTROPHILS NFR BLD AUTO: 74.7 % (ref 40–77)
NRBC BLD MANUAL-RTO: 0 % (ref 0–0.19)
NRBC BLD MANUAL-RTO: 0 % (ref 0–0.19)
PLATELET # BLD AUTO: 213 K/UL (ref 130–400)
PLATELET # BLD AUTO: 257 K/UL (ref 130–400)
POTASSIUM SERPL-SCNC: 4 MMOL/L (ref 3.5–5.1)
PROT SERPL-MCNC: 7.8 G/DL (ref 6–8.3)
RBC # BLD AUTO: 4.59 MIL/UL (ref 4.5–6.2)
RBC # BLD AUTO: 4.65 MIL/UL (ref 4.5–6.2)
SODIUM SERPL-SCNC: 135 MMOL/L (ref 136–145)
WBC # BLD AUTO: 4.9 K/UL (ref 4.8–10.8)
WBC # BLD AUTO: 7.4 K/UL (ref 4.8–10.8)

## 2021-01-01 RX ADMIN — Medication SCH MG: at 08:29

## 2021-01-01 RX ADMIN — SODIUM CHLORIDE SCH UNIT: 9 INJECTION, SOLUTION INTRAVENOUS at 12:15

## 2021-01-01 RX ADMIN — APIXABAN SCH MG: 5 TABLET, FILM COATED ORAL at 22:22

## 2021-01-01 RX ADMIN — GABAPENTIN SCH MG: 100 CAPSULE ORAL at 22:22

## 2021-01-01 RX ADMIN — INSULIN LISPRO SCH UNIT: 100 INJECTION, SOLUTION INTRAVENOUS; SUBCUTANEOUS at 17:15

## 2021-01-01 RX ADMIN — HYDROCHLOROTHIAZIDE SCH MG: 25 TABLET ORAL at 08:28

## 2021-01-01 RX ADMIN — GABAPENTIN SCH MG: 100 CAPSULE ORAL at 14:40

## 2021-01-01 RX ADMIN — FAMOTIDINE SCH MG: 10 INJECTION INTRAVENOUS at 22:21

## 2021-01-01 RX ADMIN — OXYCODONE HYDROCHLORIDE AND ACETAMINOPHEN SCH MG: 500 TABLET ORAL at 08:29

## 2021-01-01 RX ADMIN — FAMOTIDINE SCH MG: 10 INJECTION INTRAVENOUS at 08:29

## 2021-01-01 RX ADMIN — DEXAMETHASONE SODIUM PHOSPHATE SCH MG: 4 INJECTION, SOLUTION INTRAMUSCULAR; INTRAVENOUS at 08:29

## 2021-01-01 RX ADMIN — SODIUM CHLORIDE SCH MLS/HR: 9 INJECTION, SOLUTION INTRAVENOUS at 15:12

## 2021-01-01 RX ADMIN — ATORVASTATIN CALCIUM SCH MG: 40 TABLET, FILM COATED ORAL at 22:22

## 2021-01-01 RX ADMIN — SODIUM CHLORIDE SCH UNIT: 9 INJECTION, SOLUTION INTRAVENOUS at 17:15

## 2021-01-01 RX ADMIN — EZETIMIBE SCH MG: 10 TABLET ORAL at 08:28

## 2021-01-01 RX ADMIN — SODIUM CHLORIDE SCH UNIT: 9 INJECTION, SOLUTION INTRAVENOUS at 23:53

## 2021-01-01 RX ADMIN — INSULIN GLARGINE SCH UNITS: 100 INJECTION, SOLUTION SUBCUTANEOUS at 23:55

## 2021-01-01 RX ADMIN — SODIUM CHLORIDE SCH UNIT: 9 INJECTION, SOLUTION INTRAVENOUS at 08:39

## 2021-01-01 RX ADMIN — GABAPENTIN SCH MG: 100 CAPSULE ORAL at 08:29

## 2021-01-01 RX ADMIN — APIXABAN SCH MG: 5 TABLET, FILM COATED ORAL at 08:29

## 2021-01-02 VITALS — DIASTOLIC BLOOD PRESSURE: 79 MMHG | SYSTOLIC BLOOD PRESSURE: 137 MMHG

## 2021-01-02 VITALS — SYSTOLIC BLOOD PRESSURE: 135 MMHG | DIASTOLIC BLOOD PRESSURE: 89 MMHG

## 2021-01-02 VITALS — DIASTOLIC BLOOD PRESSURE: 88 MMHG | SYSTOLIC BLOOD PRESSURE: 155 MMHG

## 2021-01-02 VITALS — DIASTOLIC BLOOD PRESSURE: 81 MMHG | SYSTOLIC BLOOD PRESSURE: 145 MMHG

## 2021-01-02 LAB
ALBUMIN SERPL-MCNC: 2.9 G/DL (ref 3.5–5)
ALBUMIN SERPL-MCNC: 3 G/DL (ref 3.5–5)
ALT SERPL-CCNC: 39 U/L (ref 12–78)
ALT SERPL-CCNC: 44 U/L (ref 12–78)
AST SERPL-CCNC: 26 U/L (ref 10–37)
AST SERPL-CCNC: 28 U/L (ref 10–37)
BASOPHILS NFR BLD AUTO: 0.1 % (ref 0–5)
BILIRUB DIRECT SERPL-MCNC: 0.1 MG/DL (ref 0–0.3)
BILIRUB SERPL-MCNC: 0.3 MG/DL (ref 0.2–1)
BILIRUB SERPL-MCNC: 0.3 MG/DL (ref 0.2–1)
BUN SERPL-MCNC: 35 MG/DL (ref 7–18)
CHLORIDE SERPL-SCNC: 101 MMOL/L (ref 101–111)
CO2 SERPL-SCNC: 24 MMOL/L (ref 21–32)
CREAT SERPL-MCNC: 1.1 MG/DL (ref 0.5–1.5)
CRP SERPL HS-MCNC: 27.2 MG/L (ref 0–9)
EOSINOPHIL NFR BLD AUTO: 0.3 % (ref 0–8)
ERYTHROCYTE [DISTWIDTH] IN BLOOD BY AUTOMATED COUNT: 13.5 % (ref 11–15.5)
GFR SERPL CREATININE-BSD FRML MDRD: 80 ML/MIN (ref 60–?)
GLUCOSE SERPL-MCNC: 292 MG/DL (ref 70–105)
HCT VFR BLD AUTO: 36.7 % (ref 42–54)
LYMPHOCYTES NFR SPEC AUTO: 24.1 % (ref 21–51)
MCH RBC QN AUTO: 24.8 PG (ref 27–33)
MCHC RBC AUTO-ENTMCNC: 31.6 G/DL (ref 32–36)
MCV RBC AUTO: 78.4 FL (ref 79–99)
MONOCYTES NFR BLD AUTO: 7 % (ref 3–13)
NEUTROPHILS NFR BLD AUTO: 68.1 % (ref 40–77)
NRBC BLD MANUAL-RTO: 0 % (ref 0–0.19)
PLATELET # BLD AUTO: 274 K/UL (ref 130–400)
POTASSIUM SERPL-SCNC: 3.8 MMOL/L (ref 3.5–5.1)
PROT SERPL-MCNC: 7.5 G/DL (ref 6–8.3)
PROT SERPL-MCNC: 7.7 G/DL (ref 6–8.3)
RBC # BLD AUTO: 4.68 MIL/UL (ref 4.5–6.2)
SODIUM SERPL-SCNC: 136 MMOL/L (ref 136–145)
WBC # BLD AUTO: 7.1 K/UL (ref 4.8–10.8)

## 2021-01-02 RX ADMIN — SODIUM CHLORIDE SCH MLS/HR: 9 INJECTION, SOLUTION INTRAVENOUS at 15:01

## 2021-01-02 RX ADMIN — INSULIN LISPRO SCH UNIT: 100 INJECTION, SOLUTION INTRAVENOUS; SUBCUTANEOUS at 16:15

## 2021-01-02 RX ADMIN — FAMOTIDINE SCH MG: 10 INJECTION INTRAVENOUS at 09:22

## 2021-01-02 RX ADMIN — INSULIN LISPRO SCH UNIT: 100 INJECTION, SOLUTION INTRAVENOUS; SUBCUTANEOUS at 16:17

## 2021-01-02 RX ADMIN — SODIUM CHLORIDE SCH UNIT: 9 INJECTION, SOLUTION INTRAVENOUS at 06:57

## 2021-01-02 RX ADMIN — DEXAMETHASONE SODIUM PHOSPHATE SCH MG: 4 INJECTION, SOLUTION INTRAMUSCULAR; INTRAVENOUS at 09:21

## 2021-01-02 RX ADMIN — APIXABAN SCH MG: 5 TABLET, FILM COATED ORAL at 09:20

## 2021-01-02 RX ADMIN — Medication SCH MG: at 09:20

## 2021-01-02 RX ADMIN — INSULIN LISPRO SCH UNIT: 100 INJECTION, SOLUTION INTRAVENOUS; SUBCUTANEOUS at 12:04

## 2021-01-02 RX ADMIN — GABAPENTIN SCH MG: 100 CAPSULE ORAL at 15:01

## 2021-01-02 RX ADMIN — INSULIN LISPRO SCH UNIT: 100 INJECTION, SOLUTION INTRAVENOUS; SUBCUTANEOUS at 09:09

## 2021-01-02 RX ADMIN — HYDROCHLOROTHIAZIDE SCH MG: 25 TABLET ORAL at 09:20

## 2021-01-02 RX ADMIN — GABAPENTIN SCH MG: 100 CAPSULE ORAL at 09:21

## 2021-01-02 RX ADMIN — OXYCODONE HYDROCHLORIDE AND ACETAMINOPHEN SCH MG: 500 TABLET ORAL at 09:20

## 2021-01-02 RX ADMIN — ACETAMINOPHEN PRN MG: 325 TABLET, FILM COATED ORAL at 00:26

## 2021-01-02 RX ADMIN — INSULIN LISPRO SCH UNIT: 100 INJECTION, SOLUTION INTRAVENOUS; SUBCUTANEOUS at 12:03

## 2021-01-02 RX ADMIN — EZETIMIBE SCH MG: 10 TABLET ORAL at 09:20

## 2021-04-03 ENCOUNTER — HOSPITAL ENCOUNTER (EMERGENCY)
Dept: HOSPITAL 90 - EDH | Age: 36
Discharge: HOME | End: 2021-04-03
Payer: MEDICAID
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
## 2021-04-03 DIAGNOSIS — L03.113: Primary | ICD-10-CM

## 2021-04-03 DIAGNOSIS — Z88.2: ICD-10-CM
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
## 2021-04-03 PROCEDURE — 99283 EMERGENCY DEPT VISIT LOW MDM: CPT

## 2021-04-03 PROCEDURE — 96372 THER/PROPH/DIAG INJ SC/IM: CPT

## 2021-06-15 ENCOUNTER — HOSPITAL ENCOUNTER (EMERGENCY)
Dept: HOSPITAL 90 - EDH | Age: 36
Discharge: HOME | End: 2021-06-15
Payer: MEDICARE

## 2021-06-15 VITALS — HEIGHT: 74 IN | BODY MASS INDEX: 40.43 KG/M2 | WEIGHT: 315 LBS

## 2021-06-15 VITALS — DIASTOLIC BLOOD PRESSURE: 90 MMHG | SYSTOLIC BLOOD PRESSURE: 152 MMHG

## 2021-06-15 DIAGNOSIS — Z88.2: ICD-10-CM
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
## 2021-06-15 DIAGNOSIS — L08.89: Primary | ICD-10-CM
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
## 2021-06-15 DIAGNOSIS — Z79.4: ICD-10-CM

## 2021-06-15 DIAGNOSIS — E11.9: ICD-10-CM

## 2021-06-15 DIAGNOSIS — Z88.1: ICD-10-CM

## 2021-06-15 DIAGNOSIS — S60.511A: ICD-10-CM

## 2021-06-15 DIAGNOSIS — Z79.01: ICD-10-CM
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
## 2021-06-15 DIAGNOSIS — Z79.899: ICD-10-CM

## 2021-06-15 DIAGNOSIS — Z86.718: ICD-10-CM

## 2021-06-15 DIAGNOSIS — I25.10: ICD-10-CM

## 2021-12-08 ENCOUNTER — HOSPITAL ENCOUNTER (OUTPATIENT)
Dept: HOSPITAL 90 - SHCH | Age: 36
Discharge: HOME | End: 2021-12-08
Attending: INTERNAL MEDICINE
Payer: MEDICARE

## 2021-12-08 DIAGNOSIS — E78.5: ICD-10-CM

## 2021-12-08 DIAGNOSIS — E11.9: ICD-10-CM

## 2021-12-08 DIAGNOSIS — I10: Primary | ICD-10-CM

## 2021-12-08 DIAGNOSIS — R55: ICD-10-CM

## 2021-12-08 PROCEDURE — 93356 MYOCRD STRAIN IMG SPCKL TRCK: CPT

## 2021-12-08 PROCEDURE — 93306 TTE W/DOPPLER COMPLETE: CPT

## 2022-03-01 LAB
APPEARANCE UR: CLEAR
APTT PPP: 28.4 SEC (ref 26.3–35.5)
BASOPHILS NFR BLD AUTO: 0.7 % (ref 0–5)
BILIRUB UR QL STRIP: NEGATIVE
BUN SERPL-MCNC: 23 MG/DL (ref 7–18)
CHLORIDE SERPL-SCNC: 102 MMOL/L (ref 101–111)
CO2 SERPL-SCNC: 28 MMOL/L (ref 21–32)
COLOR UR: YELLOW
CREAT SERPL-MCNC: 1.2 MG/DL (ref 0.5–1.5)
DEPRECATED SQUAMOUS URNS QL MICRO: (no result) /HPF (ref 0–2)
EOSINOPHIL NFR BLD AUTO: 4.4 % (ref 0–8)
ERYTHROCYTE [DISTWIDTH] IN BLOOD BY AUTOMATED COUNT: 13.5 % (ref 11–15.5)
GFR SERPL CREATININE-BSD FRML MDRD: 72 ML/MIN (ref 60–?)
GLUCOSE SERPL-MCNC: 231 MG/DL (ref 70–105)
GLUCOSE UR STRIP-MCNC: >=1000 MG/DL
HCT VFR BLD AUTO: 40.5 % (ref 42–54)
HGB UR QL STRIP: (no result)
INR PPP: 1 (ref 0.85–1.15)
KETONES UR STRIP-MCNC: NEGATIVE MG/DL
LEUKOCYTE ESTERASE UR QL STRIP: NEGATIVE
LYMPHOCYTES NFR SPEC AUTO: 28.1 % (ref 21–51)
MCH RBC QN AUTO: 27 PG (ref 27–33)
MCHC RBC AUTO-ENTMCNC: 32.6 G/DL (ref 32–36)
MCV RBC AUTO: 82.8 FL (ref 79–99)
MONOCYTES NFR BLD AUTO: 7.2 % (ref 3–13)
NEUTROPHILS NFR BLD AUTO: 58.8 % (ref 40–77)
NITRITE UR QL STRIP: NEGATIVE
NRBC BLD MANUAL-RTO: 0 % (ref 0–0.19)
PH UR STRIP: 6 [PH] (ref 5–8)
PLATELET # BLD AUTO: 268 K/UL (ref 130–400)
POTASSIUM SERPL-SCNC: 4.5 MMOL/L (ref 3.5–5.1)
PROT UR QL STRIP: NEGATIVE MG/DL
PROTHROMBIN TIME: 10.9 SEC (ref 9.6–11.6)
RBC # BLD AUTO: 4.89 MIL/UL (ref 4.5–6.2)
RBC #/AREA URNS HPF: (no result) /HPF (ref 0–1)
SODIUM SERPL-SCNC: 139 MMOL/L (ref 136–145)
SP GR UR STRIP: 1.02 (ref 1–1.03)
UROBILINOGEN UR STRIP-MCNC: 0.2 MG/DL (ref 0.2–1)
WBC # BLD AUTO: 7.7 K/UL (ref 4.8–10.8)
WBC #/AREA URNS HPF: (no result) /HPF (ref 0–1)

## 2022-03-02 VITALS — DIASTOLIC BLOOD PRESSURE: 90 MMHG | SYSTOLIC BLOOD PRESSURE: 160 MMHG

## 2022-03-03 ENCOUNTER — HOSPITAL ENCOUNTER (OUTPATIENT)
Dept: HOSPITAL 90 - DAH | Age: 37
Discharge: HOME | End: 2022-03-03
Attending: INTERNAL MEDICINE
Payer: MEDICARE

## 2022-03-03 VITALS — SYSTOLIC BLOOD PRESSURE: 139 MMHG | DIASTOLIC BLOOD PRESSURE: 81 MMHG

## 2022-03-03 VITALS — DIASTOLIC BLOOD PRESSURE: 76 MMHG | SYSTOLIC BLOOD PRESSURE: 136 MMHG

## 2022-03-03 VITALS — SYSTOLIC BLOOD PRESSURE: 144 MMHG | DIASTOLIC BLOOD PRESSURE: 86 MMHG

## 2022-03-03 VITALS — SYSTOLIC BLOOD PRESSURE: 148 MMHG | DIASTOLIC BLOOD PRESSURE: 83 MMHG

## 2022-03-03 VITALS — HEIGHT: 74 IN | BODY MASS INDEX: 40.43 KG/M2 | WEIGHT: 315 LBS

## 2022-03-03 VITALS — DIASTOLIC BLOOD PRESSURE: 87 MMHG | SYSTOLIC BLOOD PRESSURE: 150 MMHG

## 2022-03-03 VITALS — DIASTOLIC BLOOD PRESSURE: 71 MMHG | SYSTOLIC BLOOD PRESSURE: 133 MMHG

## 2022-03-03 VITALS — DIASTOLIC BLOOD PRESSURE: 71 MMHG | SYSTOLIC BLOOD PRESSURE: 135 MMHG

## 2022-03-03 VITALS — DIASTOLIC BLOOD PRESSURE: 87 MMHG | SYSTOLIC BLOOD PRESSURE: 151 MMHG

## 2022-03-03 VITALS — SYSTOLIC BLOOD PRESSURE: 136 MMHG | DIASTOLIC BLOOD PRESSURE: 76 MMHG

## 2022-03-03 VITALS — SYSTOLIC BLOOD PRESSURE: 166 MMHG | DIASTOLIC BLOOD PRESSURE: 81 MMHG

## 2022-03-03 VITALS — SYSTOLIC BLOOD PRESSURE: 136 MMHG | DIASTOLIC BLOOD PRESSURE: 75 MMHG

## 2022-03-03 VITALS — DIASTOLIC BLOOD PRESSURE: 79 MMHG | SYSTOLIC BLOOD PRESSURE: 135 MMHG

## 2022-03-03 DIAGNOSIS — E78.2: ICD-10-CM

## 2022-03-03 DIAGNOSIS — Z86.718: ICD-10-CM

## 2022-03-03 DIAGNOSIS — R00.2: ICD-10-CM

## 2022-03-03 DIAGNOSIS — D64.9: ICD-10-CM

## 2022-03-03 DIAGNOSIS — E66.9: ICD-10-CM

## 2022-03-03 DIAGNOSIS — I11.0: ICD-10-CM

## 2022-03-03 DIAGNOSIS — R00.0: ICD-10-CM

## 2022-03-03 DIAGNOSIS — Z83.3: ICD-10-CM

## 2022-03-03 DIAGNOSIS — G47.33: ICD-10-CM

## 2022-03-03 DIAGNOSIS — R07.9: ICD-10-CM

## 2022-03-03 DIAGNOSIS — Z88.3: ICD-10-CM

## 2022-03-03 DIAGNOSIS — I50.9: ICD-10-CM

## 2022-03-03 DIAGNOSIS — Z90.49: ICD-10-CM

## 2022-03-03 DIAGNOSIS — I20.9: ICD-10-CM

## 2022-03-03 DIAGNOSIS — Z98.890: ICD-10-CM

## 2022-03-03 DIAGNOSIS — Z82.5: ICD-10-CM

## 2022-03-03 DIAGNOSIS — E11.42: ICD-10-CM

## 2022-03-03 DIAGNOSIS — Z87.891: ICD-10-CM

## 2022-03-03 DIAGNOSIS — Z88.8: ICD-10-CM

## 2022-03-03 DIAGNOSIS — R06.09: Primary | ICD-10-CM

## 2022-03-03 DIAGNOSIS — Z79.01: ICD-10-CM

## 2022-03-03 PROCEDURE — 93005 ELECTROCARDIOGRAM TRACING: CPT

## 2022-03-03 PROCEDURE — 85610 PROTHROMBIN TIME: CPT

## 2022-03-03 PROCEDURE — 96360 HYDRATION IV INFUSION INIT: CPT

## 2022-03-03 PROCEDURE — 96361 HYDRATE IV INFUSION ADD-ON: CPT

## 2022-03-03 PROCEDURE — 36415 COLL VENOUS BLD VENIPUNCTURE: CPT

## 2022-03-03 PROCEDURE — 96374 THER/PROPH/DIAG INJ IV PUSH: CPT

## 2022-03-03 PROCEDURE — 82948 REAGENT STRIP/BLOOD GLUCOSE: CPT

## 2022-03-03 PROCEDURE — 85025 COMPLETE CBC W/AUTO DIFF WBC: CPT

## 2022-03-03 PROCEDURE — 81001 URINALYSIS AUTO W/SCOPE: CPT

## 2022-03-03 PROCEDURE — 80048 BASIC METABOLIC PNL TOTAL CA: CPT

## 2022-03-03 PROCEDURE — 85730 THROMBOPLASTIN TIME PARTIAL: CPT

## 2022-03-03 PROCEDURE — 93460 R&L HRT ART/VENTRICLE ANGIO: CPT

## 2022-03-03 PROCEDURE — 96375 TX/PRO/DX INJ NEW DRUG ADDON: CPT

## 2022-03-03 PROCEDURE — 71045 X-RAY EXAM CHEST 1 VIEW: CPT

## 2022-05-20 ENCOUNTER — HOSPITAL ENCOUNTER (EMERGENCY)
Dept: HOSPITAL 90 - EDH | Age: 37
Discharge: HOME | End: 2022-05-20
Payer: MEDICARE

## 2022-05-20 VITALS — WEIGHT: 315 LBS | BODY MASS INDEX: 39.17 KG/M2 | HEIGHT: 75 IN

## 2022-05-20 VITALS — SYSTOLIC BLOOD PRESSURE: 128 MMHG | DIASTOLIC BLOOD PRESSURE: 77 MMHG

## 2022-05-20 DIAGNOSIS — S93.601A: Primary | ICD-10-CM
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
## 2022-05-20 DIAGNOSIS — I25.10: ICD-10-CM

## 2022-05-20 DIAGNOSIS — E10.40: ICD-10-CM

## 2022-05-20 DIAGNOSIS — M25.571: ICD-10-CM

## 2022-05-20 DIAGNOSIS — I10: ICD-10-CM

## 2022-05-20 PROCEDURE — 73630 X-RAY EXAM OF FOOT: CPT

## 2022-05-20 PROCEDURE — 73610 X-RAY EXAM OF ANKLE: CPT

## 2022-12-28 ENCOUNTER — HOSPITAL ENCOUNTER (EMERGENCY)
Dept: HOSPITAL 90 - EDH | Age: 37
Discharge: HOME | End: 2022-12-28
Payer: MEDICARE

## 2022-12-28 VITALS — SYSTOLIC BLOOD PRESSURE: 134 MMHG | DIASTOLIC BLOOD PRESSURE: 86 MMHG

## 2022-12-28 VITALS — HEIGHT: 74 IN | WEIGHT: 315 LBS | BODY MASS INDEX: 40.43 KG/M2

## 2022-12-28 DIAGNOSIS — I10: ICD-10-CM

## 2022-12-28 DIAGNOSIS — Z86.718: ICD-10-CM

## 2022-12-28 DIAGNOSIS — Z88.2: ICD-10-CM

## 2022-12-28 DIAGNOSIS — M79.89: Primary | ICD-10-CM

## 2022-12-28 DIAGNOSIS — Z98.890: ICD-10-CM

## 2022-12-28 DIAGNOSIS — Z88.1: ICD-10-CM

## 2022-12-28 DIAGNOSIS — Z79.4: ICD-10-CM

## 2022-12-28 DIAGNOSIS — I25.10: ICD-10-CM

## 2022-12-28 DIAGNOSIS — E10.9: ICD-10-CM

## 2022-12-28 DIAGNOSIS — Z90.49: ICD-10-CM

## 2022-12-28 PROCEDURE — 93971 EXTREMITY STUDY: CPT

## 2023-09-14 ENCOUNTER — HOSPITAL ENCOUNTER (EMERGENCY)
Dept: HOSPITAL 90 - EDH | Age: 38
Discharge: HOME | End: 2023-09-14
Payer: MEDICARE

## 2023-09-14 VITALS
SYSTOLIC BLOOD PRESSURE: 142 MMHG | DIASTOLIC BLOOD PRESSURE: 78 MMHG | OXYGEN SATURATION: 99 % | RESPIRATION RATE: 18 BRPM | HEART RATE: 88 BPM

## 2023-09-14 VITALS — HEIGHT: 75 IN | BODY MASS INDEX: 39.17 KG/M2 | WEIGHT: 315 LBS

## 2023-09-14 DIAGNOSIS — T82.9XXA: Primary | ICD-10-CM

## 2023-09-14 PROCEDURE — 76882 US LMTD JT/FCL EVL NVASC XTR: CPT

## 2023-11-14 ENCOUNTER — HOSPITAL ENCOUNTER (EMERGENCY)
Dept: HOSPITAL 90 - EDH | Age: 38
Discharge: HOME | End: 2023-11-14
Payer: MEDICARE

## 2023-11-14 VITALS — HEART RATE: 110 BPM | RESPIRATION RATE: 18 BRPM | OXYGEN SATURATION: 100 %

## 2023-11-14 VITALS — SYSTOLIC BLOOD PRESSURE: 168 MMHG | DIASTOLIC BLOOD PRESSURE: 93 MMHG

## 2023-11-14 VITALS — BODY MASS INDEX: 40.43 KG/M2 | HEIGHT: 74 IN | WEIGHT: 315 LBS

## 2023-11-14 DIAGNOSIS — E66.9: ICD-10-CM

## 2023-11-14 DIAGNOSIS — Z88.8: ICD-10-CM

## 2023-11-14 DIAGNOSIS — I12.9: ICD-10-CM

## 2023-11-14 DIAGNOSIS — Z90.49: ICD-10-CM

## 2023-11-14 DIAGNOSIS — Z88.1: ICD-10-CM

## 2023-11-14 DIAGNOSIS — L97.519: ICD-10-CM

## 2023-11-14 DIAGNOSIS — E11.22: ICD-10-CM

## 2023-11-14 DIAGNOSIS — Z91.041: ICD-10-CM

## 2023-11-14 DIAGNOSIS — Z79.01: ICD-10-CM

## 2023-11-14 DIAGNOSIS — E11.621: Primary | ICD-10-CM

## 2023-11-14 DIAGNOSIS — F17.200: ICD-10-CM

## 2023-11-14 DIAGNOSIS — N18.9: ICD-10-CM

## 2023-11-14 DIAGNOSIS — Z88.2: ICD-10-CM

## 2023-11-14 DIAGNOSIS — Z79.899: ICD-10-CM

## 2023-11-14 DIAGNOSIS — E78.00: ICD-10-CM

## 2023-11-14 DIAGNOSIS — G62.9: ICD-10-CM

## 2023-11-14 LAB
ALBUMIN SERPL-MCNC: 2.9 G/DL (ref 3.5–5)
ALT SERPL-CCNC: 42 U/L (ref 12–78)
AST SERPL-CCNC: 24 U/L (ref 10–37)
BASOPHILS # BLD AUTO: 0.06 K/UL (ref 0–0.2)
BASOPHILS NFR BLD AUTO: 0.4 % (ref 0–5)
BILIRUB SERPL-MCNC: 0.4 MG/DL (ref 0.2–1)
BUN SERPL-MCNC: 22 MG/DL (ref 7–18)
CHLORIDE SERPL-SCNC: 100 MMOL/L (ref 101–111)
CO2 SERPL-SCNC: 26 MMOL/L (ref 21–32)
CREAT SERPL-MCNC: 1.4 MG/DL (ref 0.5–1.5)
EOSINOPHIL # BLD AUTO: 0.43 K/UL (ref 0–0.7)
EOSINOPHIL NFR BLD AUTO: 3.2 % (ref 0–8)
ERYTHROCYTE [DISTWIDTH] IN BLOOD BY AUTOMATED COUNT: 13.3 % (ref 11–15.5)
GFR SERPL CREATININE-BSD FRML MDRD: 66 ML/MIN (ref 90–?)
GLUCOSE SERPL-MCNC: 82 MG/DL (ref 70–105)
HCT VFR BLD AUTO: 34.5 % (ref 42–54)
IMM GRANULOCYTES # BLD: 0.17 K/UL (ref 0–1)
LYMPHOCYTES # SPEC AUTO: 1.7 K/UL (ref 1–4.8)
LYMPHOCYTES NFR SPEC AUTO: 12.7 % (ref 21–51)
MCH RBC QN AUTO: 26.6 PG (ref 27–33)
MCHC RBC AUTO-ENTMCNC: 31.9 G/DL (ref 32–36)
MCV RBC AUTO: 83.5 FL (ref 79–99)
MONOCYTES # BLD AUTO: 1.2 K/UL (ref 0.1–1)
MONOCYTES NFR BLD AUTO: 8.6 % (ref 3–13)
NEUTROPHILS # BLD AUTO: 10 K/UL (ref 1.8–7.7)
NEUTROPHILS NFR BLD AUTO: 73.8 % (ref 40–77)
NRBC BLD MANUAL-RTO: 0 % (ref 0–0.19)
PLATELET # BLD AUTO: 428 K/UL (ref 130–400)
POTASSIUM SERPL-SCNC: 3.9 MMOL/L (ref 3.5–5.1)
PROT SERPL-MCNC: 8.4 G/DL (ref 6–8.3)
RBC # BLD AUTO: 4.13 MIL/UL (ref 4.5–6.2)
SODIUM SERPL-SCNC: 137 MMOL/L (ref 136–145)
WBC # BLD AUTO: 13.6 K/UL (ref 4.8–10.8)

## 2023-11-14 PROCEDURE — 80053 COMPREHEN METABOLIC PANEL: CPT

## 2023-11-14 PROCEDURE — 36415 COLL VENOUS BLD VENIPUNCTURE: CPT

## 2023-11-14 PROCEDURE — 93971 EXTREMITY STUDY: CPT

## 2023-11-14 PROCEDURE — 85378 FIBRIN DEGRADE SEMIQUANT: CPT

## 2023-11-14 PROCEDURE — 85025 COMPLETE CBC W/AUTO DIFF WBC: CPT

## 2023-11-25 ENCOUNTER — HOSPITAL ENCOUNTER (EMERGENCY)
Dept: HOSPITAL 90 - EDH | Age: 38
Discharge: HOME | End: 2023-11-25
Payer: MEDICARE

## 2023-11-25 VITALS
HEART RATE: 97 BPM | RESPIRATION RATE: 16 BRPM | OXYGEN SATURATION: 98 % | SYSTOLIC BLOOD PRESSURE: 139 MMHG | DIASTOLIC BLOOD PRESSURE: 67 MMHG

## 2023-11-25 VITALS — BODY MASS INDEX: 40.43 KG/M2 | WEIGHT: 315 LBS | HEIGHT: 74 IN

## 2023-11-25 VITALS — TEMPERATURE: 98.5 F

## 2023-11-25 DIAGNOSIS — Z91.041: ICD-10-CM

## 2023-11-25 DIAGNOSIS — E11.22: ICD-10-CM

## 2023-11-25 DIAGNOSIS — Z88.1: ICD-10-CM

## 2023-11-25 DIAGNOSIS — K52.9: Primary | ICD-10-CM

## 2023-11-25 DIAGNOSIS — Z20.822: ICD-10-CM

## 2023-11-25 DIAGNOSIS — N18.9: ICD-10-CM

## 2023-11-25 DIAGNOSIS — Z79.01: ICD-10-CM

## 2023-11-25 DIAGNOSIS — I12.9: ICD-10-CM

## 2023-11-25 DIAGNOSIS — Z88.2: ICD-10-CM

## 2023-11-25 DIAGNOSIS — Z79.899: ICD-10-CM

## 2023-11-25 DIAGNOSIS — Z90.49: ICD-10-CM

## 2023-11-25 DIAGNOSIS — J44.9: ICD-10-CM

## 2023-11-25 DIAGNOSIS — Z88.8: ICD-10-CM

## 2023-11-25 LAB
ALBUMIN SERPL-MCNC: 2.9 G/DL (ref 3.5–5)
ALT SERPL-CCNC: 32 U/L (ref 12–78)
APPEARANCE UR: CLEAR
AST SERPL-CCNC: 18 U/L (ref 10–37)
BASOPHILS # BLD AUTO: 0.04 K/UL (ref 0–0.2)
BASOPHILS NFR BLD AUTO: 0.3 % (ref 0–5)
BILIRUB SERPL-MCNC: 0.5 MG/DL (ref 0.2–1)
BILIRUB UR QL STRIP: NEGATIVE MG/DL
BUN SERPL-MCNC: 21 MG/DL (ref 7–18)
CHLORIDE SERPL-SCNC: 99 MMOL/L (ref 101–111)
CO2 SERPL-SCNC: 28 MMOL/L (ref 21–32)
COLOR UR: YELLOW
CREAT SERPL-MCNC: 1.4 MG/DL (ref 0.5–1.5)
DEPRECATED SQUAMOUS URNS QL MICRO: (no result) /HPF (ref 0–2)
EOSINOPHIL # BLD AUTO: 0.09 K/UL (ref 0–0.7)
EOSINOPHIL NFR BLD AUTO: 0.6 % (ref 0–8)
ERYTHROCYTE [DISTWIDTH] IN BLOOD BY AUTOMATED COUNT: 13.7 % (ref 11–15.5)
GFR SERPL CREATININE-BSD FRML MDRD: 65 ML/MIN (ref 90–?)
GLUCOSE SERPL-MCNC: 108 MG/DL (ref 70–105)
GLUCOSE UR STRIP-MCNC: 300 MG/DL
HCT VFR BLD AUTO: 32.8 % (ref 42–54)
HGB UR QL STRIP: (no result)
IMM GRANULOCYTES # BLD: 0.13 K/UL (ref 0–1)
KETONES UR STRIP-MCNC: NEGATIVE MG/DL
LEUKOCYTE ESTERASE UR QL STRIP: NEGATIVE LEU/UL
LYMPHOCYTES # SPEC AUTO: 1.5 K/UL (ref 1–4.8)
LYMPHOCYTES NFR SPEC AUTO: 10.5 % (ref 21–51)
MCH RBC QN AUTO: 26.1 PG (ref 27–33)
MCHC RBC AUTO-ENTMCNC: 31.4 G/DL (ref 32–36)
MCV RBC AUTO: 83.2 FL (ref 79–99)
MICRO URNS: YES
MONOCYTES # BLD AUTO: 1.3 K/UL (ref 0.1–1)
MONOCYTES NFR BLD AUTO: 8.7 % (ref 3–13)
MUCOUS THREADS URNS QL MICRO: (no result) LPF
NEUTROPHILS # BLD AUTO: 11.4 K/UL (ref 1.8–7.7)
NEUTROPHILS NFR BLD AUTO: 79 % (ref 40–77)
NITRITE UR QL STRIP: NEGATIVE
NRBC BLD MANUAL-RTO: 0 % (ref 0–0.19)
PH UR STRIP: 5.5 [PH] (ref 5–8)
PLATELET # BLD AUTO: 441 K/UL (ref 130–400)
POTASSIUM SERPL-SCNC: 4 MMOL/L (ref 3.5–5.1)
PROT SERPL-MCNC: 8.5 G/DL (ref 6–8.3)
PROT UR QL STRIP: 50 MG/DL
RBC # BLD AUTO: 3.94 MIL/UL (ref 4.5–6.2)
RBC #/AREA URNS HPF: (no result) /HPF (ref 0–1)
SARS-COV-2 RNA SPEC QL NAA+PROBE: (no result)
SODIUM SERPL-SCNC: 135 MMOL/L (ref 136–145)
SP GR UR STRIP: 1.02 (ref 1–1.03)
UROBILINOGEN UR STRIP-MCNC: 0.2 MG/DL (ref 0.2–1)
WBC # BLD AUTO: 14.4 K/UL (ref 4.8–10.8)
WBC #/AREA URNS HPF: (no result) /HPF (ref 0–1)

## 2023-11-25 PROCEDURE — 81001 URINALYSIS AUTO W/SCOPE: CPT

## 2023-11-25 PROCEDURE — 71045 X-RAY EXAM CHEST 1 VIEW: CPT

## 2023-11-25 PROCEDURE — 85025 COMPLETE CBC W/AUTO DIFF WBC: CPT

## 2023-11-25 PROCEDURE — 80053 COMPREHEN METABOLIC PANEL: CPT

## 2023-11-25 PROCEDURE — 96375 TX/PRO/DX INJ NEW DRUG ADDON: CPT

## 2023-11-25 PROCEDURE — 36415 COLL VENOUS BLD VENIPUNCTURE: CPT

## 2023-11-25 PROCEDURE — 96376 TX/PRO/DX INJ SAME DRUG ADON: CPT

## 2023-11-25 PROCEDURE — 74176 CT ABD & PELVIS W/O CONTRAST: CPT

## 2023-11-25 PROCEDURE — 87804 INFLUENZA ASSAY W/OPTIC: CPT

## 2023-11-25 PROCEDURE — 87635 SARS-COV-2 COVID-19 AMP PRB: CPT

## 2023-11-25 PROCEDURE — C9803 HOPD COVID-19 SPEC COLLECT: HCPCS

## 2023-11-25 PROCEDURE — 96374 THER/PROPH/DIAG INJ IV PUSH: CPT

## 2023-11-25 PROCEDURE — 99285 EMERGENCY DEPT VISIT HI MDM: CPT

## 2023-11-25 PROCEDURE — 83690 ASSAY OF LIPASE: CPT

## 2023-11-25 PROCEDURE — 93005 ELECTROCARDIOGRAM TRACING: CPT

## 2023-11-25 PROCEDURE — 83605 ASSAY OF LACTIC ACID: CPT

## 2024-03-29 ENCOUNTER — HOSPITAL ENCOUNTER (OUTPATIENT)
Dept: HOSPITAL 90 - LAB | Age: 39
Discharge: HOME | End: 2024-03-29
Attending: INTERNAL MEDICINE
Payer: MEDICARE

## 2024-03-29 DIAGNOSIS — R60.9: ICD-10-CM

## 2024-03-29 DIAGNOSIS — I73.9: ICD-10-CM

## 2024-03-29 DIAGNOSIS — E11.59: Primary | ICD-10-CM

## 2024-03-29 LAB
ALBUMIN SERPL-MCNC: 3.7 G/DL (ref 3.5–5)
ALT SERPL-CCNC: 25 U/L (ref 12–78)
AST SERPL-CCNC: 25 U/L (ref 10–37)
BASOPHILS # BLD AUTO: 0.05 K/UL (ref 0–0.2)
BASOPHILS NFR BLD AUTO: 0.7 % (ref 0–5)
BILIRUB SERPL-MCNC: 0.4 MG/DL (ref 0.2–1)
BUN SERPL-MCNC: 16 MG/DL (ref 7–18)
CHLORIDE SERPL-SCNC: 106 MMOL/L (ref 101–111)
CHOLEST SERPL-MCNC: 130 MG/DL (ref ?–200)
CO2 SERPL-SCNC: 25 MMOL/L (ref 21–32)
CREAT SERPL-MCNC: 1 MG/DL (ref 0.5–1.3)
EOSINOPHIL # BLD AUTO: 0.3 K/UL (ref 0–0.7)
EOSINOPHIL NFR BLD AUTO: 3.9 % (ref 0–8)
ERYTHROCYTE [DISTWIDTH] IN BLOOD BY AUTOMATED COUNT: 15.9 % (ref 11–15.5)
GFR SERPL CREATININE-BSD FRML MDRD: 98 ML/MIN (ref 90–?)
GLUCOSE SERPL-MCNC: 94 MG/DL (ref 70–105)
HCT VFR BLD AUTO: 43.5 % (ref 42–54)
HDLC SERPL-MCNC: 45 MG/DL (ref 29–71)
IMM GRANULOCYTES # BLD: 0.03 K/UL (ref 0–1)
LDLC SERPL CALC-MCNC: 71 MG/DL (ref 0–99)
LYMPHOCYTES # SPEC AUTO: 2.5 K/UL (ref 1–4.8)
LYMPHOCYTES NFR SPEC AUTO: 32.8 % (ref 21–51)
MAGNESIUM SERPL-MCNC: 1.6 MG/DL (ref 1.8–2.4)
MCH RBC QN AUTO: 27.4 PG (ref 27–33)
MCHC RBC AUTO-ENTMCNC: 31.7 G/DL (ref 32–36)
MCV RBC AUTO: 86.3 FL (ref 79–99)
MONOCYTES # BLD AUTO: 0.5 K/UL (ref 0.1–1)
MONOCYTES NFR BLD AUTO: 6.8 % (ref 3–13)
NEUTROPHILS # BLD AUTO: 4.2 K/UL (ref 1.8–7.7)
NEUTROPHILS NFR BLD AUTO: 55.4 % (ref 40–77)
NRBC BLD MANUAL-RTO: 0 % (ref 0–0.19)
PLATELET # BLD AUTO: 290 K/UL (ref 130–400)
POTASSIUM SERPL-SCNC: 4.5 MMOL/L (ref 3.5–5.1)
PROT SERPL-MCNC: 7.9 G/DL (ref 6–8.3)
RBC # BLD AUTO: 5.04 MIL/UL (ref 4.5–6.2)
SODIUM SERPL-SCNC: 141 MMOL/L (ref 136–145)
TRIGL SERPL-MCNC: 164 MG/DL (ref 30–200)
WBC # BLD AUTO: 7.6 K/UL (ref 4.8–10.8)

## 2024-03-29 PROCEDURE — 83735 ASSAY OF MAGNESIUM: CPT

## 2024-03-29 PROCEDURE — 80061 LIPID PANEL: CPT

## 2024-03-29 PROCEDURE — 85025 COMPLETE CBC W/AUTO DIFF WBC: CPT

## 2024-03-29 PROCEDURE — 80053 COMPREHEN METABOLIC PANEL: CPT

## 2024-03-29 PROCEDURE — 36415 COLL VENOUS BLD VENIPUNCTURE: CPT

## 2024-09-05 ENCOUNTER — HOSPITAL ENCOUNTER (EMERGENCY)
Dept: HOSPITAL 90 - EDH | Age: 39
Discharge: HOME | End: 2024-09-05
Payer: COMMERCIAL

## 2024-09-05 VITALS — BODY MASS INDEX: 40.43 KG/M2 | WEIGHT: 315 LBS | HEIGHT: 74 IN

## 2024-09-05 VITALS
SYSTOLIC BLOOD PRESSURE: 155 MMHG | OXYGEN SATURATION: 99 % | DIASTOLIC BLOOD PRESSURE: 85 MMHG | RESPIRATION RATE: 18 BRPM | TEMPERATURE: 98.4 F | HEART RATE: 84 BPM

## 2024-09-05 DIAGNOSIS — Z79.899: ICD-10-CM

## 2024-09-05 DIAGNOSIS — Z88.8: ICD-10-CM

## 2024-09-05 DIAGNOSIS — E11.9: ICD-10-CM

## 2024-09-05 DIAGNOSIS — I11.0: ICD-10-CM

## 2024-09-05 DIAGNOSIS — Z88.2: ICD-10-CM

## 2024-09-05 DIAGNOSIS — J44.9: ICD-10-CM

## 2024-09-05 DIAGNOSIS — Z88.1: ICD-10-CM

## 2024-09-05 DIAGNOSIS — Z86.16: ICD-10-CM

## 2024-09-05 DIAGNOSIS — Z91.041: ICD-10-CM

## 2024-09-05 DIAGNOSIS — Z90.49: ICD-10-CM

## 2024-09-05 DIAGNOSIS — I50.9: ICD-10-CM

## 2024-09-05 DIAGNOSIS — F17.200: ICD-10-CM

## 2024-09-05 DIAGNOSIS — L97.529: Primary | ICD-10-CM

## 2024-09-05 LAB
BASOPHILS # BLD AUTO: 0.05 K/UL (ref 0–0.2)
BASOPHILS NFR BLD AUTO: 0.5 % (ref 0–5)
BUN SERPL-MCNC: 15 MG/DL (ref 7–18)
CHLORIDE SERPL-SCNC: 103 MMOL/L (ref 101–111)
CO2 SERPL-SCNC: 25 MMOL/L (ref 21–32)
CREAT SERPL-MCNC: 1.3 MG/DL (ref 0.5–1.3)
EOSINOPHIL # BLD AUTO: 0.37 K/UL (ref 0–0.7)
EOSINOPHIL NFR BLD AUTO: 3.6 % (ref 0–8)
ERYTHROCYTE [DISTWIDTH] IN BLOOD BY AUTOMATED COUNT: 12.9 % (ref 11–15.5)
GFR SERPL CREATININE-BSD FRML MDRD: 71 ML/MIN (ref 90–?)
GLUCOSE SERPL-MCNC: 99 MG/DL (ref 70–105)
HCT VFR BLD AUTO: 40 % (ref 42–54)
IMM GRANULOCYTES # BLD: 0.1 K/UL (ref 0–1)
LYMPHOCYTES # SPEC AUTO: 2.3 K/UL (ref 1–4.8)
LYMPHOCYTES NFR SPEC AUTO: 22.2 % (ref 21–51)
MCH RBC QN AUTO: 28.4 PG (ref 27–33)
MCHC RBC AUTO-ENTMCNC: 34 G/DL (ref 32–36)
MCV RBC AUTO: 83.5 FL (ref 79–99)
MONOCYTES # BLD AUTO: 0.7 K/UL (ref 0.1–1)
MONOCYTES NFR BLD AUTO: 7.2 % (ref 3–13)
NEUTROPHILS # BLD AUTO: 6.7 K/UL (ref 1.8–7.7)
NEUTROPHILS NFR BLD AUTO: 65.5 % (ref 40–77)
NRBC BLD MANUAL-RTO: 0 % (ref 0–0.19)
PLATELET # BLD AUTO: 280 K/UL (ref 130–400)
POTASSIUM SERPL-SCNC: 3.7 MMOL/L (ref 3.5–5.1)
RBC # BLD AUTO: 4.79 MIL/UL (ref 4.5–6.2)
SODIUM SERPL-SCNC: 139 MMOL/L (ref 136–145)
WBC # BLD AUTO: 10.3 K/UL (ref 4.8–10.8)

## 2024-09-05 PROCEDURE — 83605 ASSAY OF LACTIC ACID: CPT

## 2024-09-05 PROCEDURE — 80048 BASIC METABOLIC PNL TOTAL CA: CPT

## 2024-09-05 PROCEDURE — 87040 BLOOD CULTURE FOR BACTERIA: CPT

## 2024-09-05 PROCEDURE — 36415 COLL VENOUS BLD VENIPUNCTURE: CPT

## 2024-09-05 PROCEDURE — 73630 X-RAY EXAM OF FOOT: CPT

## 2024-09-05 PROCEDURE — 85025 COMPLETE CBC W/AUTO DIFF WBC: CPT

## 2025-01-15 ENCOUNTER — HOSPITAL ENCOUNTER (EMERGENCY)
Dept: HOSPITAL 90 - EDH | Age: 40
Discharge: HOME | End: 2025-01-15
Payer: COMMERCIAL

## 2025-01-15 VITALS — WEIGHT: 315 LBS | HEIGHT: 74 IN | BODY MASS INDEX: 40.43 KG/M2

## 2025-01-15 VITALS
DIASTOLIC BLOOD PRESSURE: 87 MMHG | RESPIRATION RATE: 18 BRPM | TEMPERATURE: 98.3 F | SYSTOLIC BLOOD PRESSURE: 139 MMHG | HEART RATE: 85 BPM | OXYGEN SATURATION: 96 %

## 2025-01-15 DIAGNOSIS — Z91.041: ICD-10-CM

## 2025-01-15 DIAGNOSIS — Z88.1: ICD-10-CM

## 2025-01-15 DIAGNOSIS — F17.200: ICD-10-CM

## 2025-01-15 DIAGNOSIS — Z88.2: ICD-10-CM

## 2025-01-15 DIAGNOSIS — I10: ICD-10-CM

## 2025-01-15 DIAGNOSIS — Z88.8: ICD-10-CM

## 2025-01-15 DIAGNOSIS — T78.40XA: Primary | ICD-10-CM

## 2025-01-15 DIAGNOSIS — E78.00: ICD-10-CM

## 2025-01-15 DIAGNOSIS — E11.9: ICD-10-CM
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
## 2025-01-15 DIAGNOSIS — Z79.85: ICD-10-CM

## 2025-01-15 DIAGNOSIS — Z79.899: ICD-10-CM

## 2025-01-15 LAB
ALBUMIN SERPL-MCNC: 3.4 G/DL (ref 3.5–5)
ALT SERPL-CCNC: 28 U/L (ref 12–78)
AST SERPL-CCNC: 20 U/L (ref 10–37)
BASOPHILS # BLD AUTO: 0.06 K/UL (ref 0–0.2)
BASOPHILS NFR BLD AUTO: 0.6 % (ref 0–5)
BILIRUB DIRECT SERPL-MCNC: 0.1 MG/DL (ref 0–0.3)
BILIRUB SERPL-MCNC: 0.4 MG/DL (ref 0.2–1)
BUN SERPL-MCNC: 19 MG/DL (ref 7–18)
CHLORIDE SERPL-SCNC: 108 MMOL/L (ref 101–111)
CO2 SERPL-SCNC: 31 MMOL/L (ref 21–32)
CREAT SERPL-MCNC: 1.2 MG/DL (ref 0.5–1.3)
EOSINOPHIL # BLD AUTO: 0.8 K/UL (ref 0–0.7)
EOSINOPHIL NFR BLD AUTO: 7.5 % (ref 0–8)
ERYTHROCYTE [DISTWIDTH] IN BLOOD BY AUTOMATED COUNT: 13.4 % (ref 11–15.5)
GFR SERPL CREATININE-BSD FRML MDRD: 78 ML/MIN (ref 90–?)
GLUCOSE SERPL-MCNC: 115 MG/DL (ref 70–105)
HCT VFR BLD AUTO: 41.4 % (ref 42–54)
IMM GRANULOCYTES # BLD: 0.14 K/UL (ref 0–1)
LYMPHOCYTES # SPEC AUTO: 3.1 K/UL (ref 1–4.8)
LYMPHOCYTES NFR SPEC AUTO: 28.5 % (ref 21–51)
MCH RBC QN AUTO: 28.3 PG (ref 27–33)
MCHC RBC AUTO-ENTMCNC: 33.3 G/DL (ref 32–36)
MCV RBC AUTO: 84.8 FL (ref 79–99)
MONOCYTES # BLD AUTO: 0.9 K/UL (ref 0.1–1)
MONOCYTES NFR BLD AUTO: 8.3 % (ref 3–13)
NEUTROPHILS # BLD AUTO: 5.8 K/UL (ref 1.8–7.7)
NEUTROPHILS NFR BLD AUTO: 53.8 % (ref 40–77)
NRBC BLD MANUAL-RTO: 0 % (ref 0–0.19)
PLATELET # BLD AUTO: 273 K/UL (ref 130–400)
POTASSIUM SERPL-SCNC: 3.9 MMOL/L (ref 3.5–5.1)
PROT SERPL-MCNC: 7.4 G/DL (ref 6–8.3)
RBC # BLD AUTO: 4.88 MIL/UL (ref 4.5–6.2)
SODIUM SERPL-SCNC: 146 MMOL/L (ref 136–145)
WBC # BLD AUTO: 10.7 K/UL (ref 4.8–10.8)

## 2025-01-15 PROCEDURE — 96374 THER/PROPH/DIAG INJ IV PUSH: CPT

## 2025-01-15 PROCEDURE — 36415 COLL VENOUS BLD VENIPUNCTURE: CPT

## 2025-01-15 PROCEDURE — 99284 EMERGENCY DEPT VISIT MOD MDM: CPT

## 2025-01-15 PROCEDURE — 80048 BASIC METABOLIC PNL TOTAL CA: CPT

## 2025-01-15 PROCEDURE — 96375 TX/PRO/DX INJ NEW DRUG ADDON: CPT

## 2025-01-15 PROCEDURE — 80076 HEPATIC FUNCTION PANEL: CPT

## 2025-01-15 PROCEDURE — 85025 COMPLETE CBC W/AUTO DIFF WBC: CPT

## 2025-01-15 RX ADMIN — SODIUM CHLORIDE STA MLS/HR: 0.9 INJECTION, SOLUTION INTRAVENOUS at 22:27

## 2025-01-15 RX ADMIN — FAMOTIDINE STA MG: 10 INJECTION INTRAVENOUS at 22:27

## 2025-01-15 NOTE — ERN
ED Note


History of Present Illness


Stated Complaint:  RASH


Chief Complaint:  Skin Rash/Abscess


Time Seen by MD:  20:41


Time Seen by Midlevel:  20:38


Dictation:


39-year-old male with a history of diabetes and hypertension coming in 

complaining of a generalized by a rash on and off for one year.  Patient states 

it usually takes Benadryl and it goes away but this time he took Benadryl and 

has not gotten better.  Denies any recent illness, denies any fever, cough, 

congestion, throat pain.  Denies any shortness a breath.


Allergies:  


Coded Allergies:  


     doxycycline (Unverified  Allergy, Intermediate, HIVES, 12/27/23)


     iodine (Unverified  Allergy, Unknown, 3/2/22)


     sulfamethoxazole (Unverified  Allergy, Unknown, 7/30/18)


     trimethoprim (Unverified  Allergy, Unknown, 7/30/18)


Uncoded Allergies:  


     lactose intolerant (Adverse Reaction, Intermediate, 1/1/24)


Home Meds


Reported Medications


Ezetimibe (Ezetimibe) 10 Mg Tablet, 1 TAB PO HS


   1/9/24


Fluticasone Propionate (Flonase Nasal Spry) 50 Mcg/Actuation Spry, 1 SPRY EN 

DAILY


   1/9/24


Metoprolol Succinate (Metoprolol Succinate) 100 Mg Tab.er.24h, 1 TAB PO DAILY


   1/9/24


Semaglutide (Ozempic) 2 Mg/0.75 Ml (8 Mg/3 Ml) Pen.injctr, 2 MG SQ QWEEK


   1/9/24


Gabapentin (Gralise) 300 Mg Tab.er.24h, 1 CAP PO TID


   1/9/24


Pantoprazole Sodium (Pantoprazole Sodium) 40 Mg Tablet.dr, 1 TAB PO DAILY


   1/9/24


Metoclopramide HCl (Metoclopramide HCl) 5 Mg Tablet, 1 TAB PO TID


   1/9/24


Losartan Potassium (Losartan Potassium) 50 Mg Tablet, 1 TAB PO DAILY


   1/9/24


Fexofenadine HCl (Fexofenadine HCl) 180 Mg Tablet, 180 MG PO DAILY PRN for 

allergy, TAB


   9/2/23


Amlodipine Besylate (Amlodipine Besylate) 5 Mg Tablet, 5 MG PO DAILY, TAB


   9/2/23


Montelukast Sodium (Montelukast Sodium) 10 Mg Tablet, 10 MG PO DAILY, TAB


   9/2/23


Hydrochlorothiazide (Hydrochlorothiazide) 25 Mg Tablet, 25 MG PO DAILY, TAB


   9/10/20





Past Medical History


Past Medical History:  Diabetes-Type II, High Cholesterol, Heart Disease, 

Hypertension, Renal Disese


Additional Past Medical Hx:  COVID, CARDIOMEGALY AT BIRTH


Surgical History:  Other


Surgical History Other:  RT BLK


Family History:  Negative


Social History:  Smokers, Drugs, ETOH





Review of System


Dictation


Constitutional: Negative for fever,chills, and weight loss


Eyes: Negative for injury, pain,redness, and discharge


ENT: Negative for injury,pain or swelling


Cardiovascular: Negative for chest pain, palpitations, and edema


Respiratory: Negative for shortness of breath, cough, and wheezing, 


Abdomen/GI: Negative for abdominal pain, nausea, vomiting, diarrhea, and 

constipation


Back: Negative for injury and pain


: Negative for injury, bleeding and discharge


MS/Extremity: Negative for injury and deformity


Skin:  Positive for rash, no discoloration


Neuro: Negative for headache, weakness, numbness, tingling, and seizure 


Psych: Negative for suicide ideation, homicidal ideation, and hallucinations


Review of Systems:   was completed





Initial Vital Sign


VS





                                   Vital Signs








  Date Time  Temp Pulse Resp B/P (MAP) Pulse Ox O2 Delivery O2 Flow Rate FiO2


 


1/15/25 20:53 98.1 89 20 145/98 94 Room Air  


 


1/15/25 23:35       0 21











Physical Exam


Dictation


General: awake, alert, NAD


Head/Face: Normocephalic, atraumatic


Eyes: PERRL, EOMI, vision at baseline


ENT: oral cavity clear, TMs clear, no signs of infection


Neck: Trachea midline, supple, no nuchal rigidity


Cardiovascular: RRR, normal S1/S2, No MRGs, no JVD


Respiratory: CTAB, no respiratory distress, No rales or wheezes


Abdomen: Soft, non-tender, non-distended, normal bowel sounds, no guarding or 

rebound.


Skin: Warm, dry, normal turgor, generalized rash, itching.


MS/Extremity: Pulses equal, no cyanosis, neurovascular intact, FROM


Neuro: COAx4, GCS 15, strength 5/5, CN 2-12 intact, normal cerebellar exam, 

normal gait,


Psych: Normal behavior, mood, and affect normal





Results (Laboratory/Radiology)


Laboratory/Radiology





Laboratory Tests








Test


 1/15/25


21:30


 


White Blood Count


 10.7 K/uL


(4.8-10.8)


 


Red Blood Count


 4.88 MIL/uL


(4.50-6.20)


 


Hemoglobin


 13.8 g/dL


(14.0-18.0)  L


 


Hematocrit


 41.4 % (42-54)


L


 


Mean Corpuscular Volume


 84.8 fL


(79-99)


 


Mean Corpuscular Hemoglobin


 28.3 pg


(27.0-33.0)


 


Mean Corpuscular Hemoglobin


Concent 33.3 g/dL


(32.0-36.0)


 


Red Cell Distribution Width


 13.4 %


(11.0-15.5)


 


Platelet Count


 273 K/uL


(130-400)


 


Mean Platelet Volume


 10.8 fL


(7.5-10.5)  H


 


Immature Granulocyte % (Auto) 1.3 % (0-1)  H


 


Neutrophils (%) (Auto)


 53.8 %


(40.0-77.0)


 


Lymphocytes (%) (Auto)


 28.5 %


(21.0-51.0)


 


Monocytes (%) (Auto)


 8.3 %


(3.0-13.0)


 


Eosinophils (%) (Auto)


 7.5 %


(0.0-8.0)


 


Basophils (%) (Auto)


 0.6 %


(0.0-5.0)


 


Neutrophils # (Auto)


 5.8 K/uL


(1.8-7.7)


 


Lymphocytes # (Auto)


 3.1 K/uL


(1.0-4.8)


 


Monocytes # (Auto)


 0.9 K/uL


(0.1-1.0)


 


Eosinophils # (Auto)


 0.80 K/uL


(0.00-0.70)  H


 


Basophils # (Auto)


 0.06 K/uL


(0.00-0.20)


 


Absolute Immature Granulocyte


(auto 0.14 K/uL


(0-1)


 


Nucleated Red Blood Cells


 0.0 %


(0.0-0.19)


 


Sodium Level


 146 mmol/L


(136-145)  H


 


Potassium Level


 3.9 mmol/L


(3.5-5.1)


 


Chloride Level


 108 mmol/L


(101-111)


 


Carbon Dioxide Level


 31 mmol/L


(21-32)


 


Blood Urea Nitrogen


 19 mg/dL


(7-18)  H


 


Creatinine


 1.2 mg/dL


(0.5-1.3)


 


Glomerular Filtration Rate


Calc 78 mL/min


(>90)


 


Random Glucose


 115 mg/dL


()  H


 


Total Calcium


 9.1 mg/dL


(8.5-10.1)


 


Total Bilirubin


 0.4 mg/dL


(0.2-1.0)


 


Direct Bilirubin


 0.1 mg/dL


(0.0-0.3)


 


Aspartate Amino Transf


(AST/SGOT) 20 U/L (10-37)





 


Alanine Aminotransferase


(ALT/SGPT) 28 U/L (12-78)





 


Alkaline Phosphatase


 82 U/L


()


 


Total Protein


 7.4 g/dL


(6.0-8.3)


 


Albumin


 3.4 g/dL


(3.5-5.0)  L











ED Course


ED Course





Orders








Procedure Category Date Status





  Time 


 


Cbc With Differential LAB 1/15/25 Complete





  21:19 


 


Basic Metabolic Panel LAB 1/15/25 Complete





  21:19 


 


Hepatic Function Panel LAB 1/15/25 Complete





  21:19 


 


Diphenhydramine Hcl PHA 1/15/25 Complete





 (Benadryl Inj)  21:19 


 


Famotidine 20mg Vial PHA 1/15/25 Complete





 (Pepcid 20mg Vial)  21:19 


 


Methylprednisolone PHA 1/15/25 Complete





Succ 125mg (Solu-Medr  21:19 


 


0.9%Nacl 1000ml (Ns PHA 1/15/25 Complete





1000ml)  21:19 








Current Medications








 Medications


  (Trade)  Dose


 Ordered  Sig/Naun


 Route


 PRN Reason  Start Time


 Stop Time Status Last Admin


Dose Admin


 


 Diphenhydramine


 HCl


  (BENAdryl INJ)  50 mg  ONCE  STAT


 IV


   1/15/25 21:19


 1/15/25 21:22 DC 1/15/25 22:27





 


 Famotidine


  (Pepcid 20mg


 Vial)  20 mg  ONCE  STAT


 IV


   1/15/25 21:19


 1/15/25 21:22 DC 1/15/25 22:27





 


 Methylprednisolone


 Sodium Succinate


  (Solu-medROL


 125MG)  125 mg  ONCE  STAT


 IVP


   1/15/25 21:19


 1/15/25 21:22 DC 1/15/25 22:27





 


 Sodium Chloride  1,000 ml @ 


 1,000 mls/hr  Q1H STAT


 IV


   1/15/25 21:19


 1/15/25 22:18 DC 1/15/25 22:27











Vital Signs








  Date Time  Temp Pulse Resp B/P (MAP) Pulse Ox O2 Delivery O2 Flow Rate FiO2


 


1/15/25 23:35 98.2 85 18 139/87 96 Room Air* 0 21


 


1/15/25 20:53 98.1 89 20 145/98 94 Room Air  











Medical Decision Making


MDM


MDM: 


Differential diagnosis:  Dermatitis, allergic reaction, hives, scabies


Rationale: Tests considered and ordered secondary to shared decision making 

include:


Previous outside records reviewed: Old ER visits.


Risk of complication and/or morbidity or mortality of patient management: None


Medications-Per medication reconciliation


Need for hospitalization: Patient does not meet criteria for hospitalization.


Need for emergency major/minor surgery: No





There are no social concerns with this patient.





Prescription drug management


Prescriptions will include symptomatic care





Patient's prior external medical records from other ER visits were reviewed by 

me as indicated.  Prior testing and results from previous visits were reviewed. 

Prior tests were taken into account with medical decision making and resource 

utilization, independent historian/historians were used to obtain complete 

medical history.





I independently interpreted the test that were performed, results were reviewed 

by me and considered findings on radiology if ordered.





Medical management and examination interpretation discussions were had by me 

with other qualified healthcare professionals as indicated for the patient's 

care.





DX & DISP


Disposition:  Discharge


Departure


Impression:  


   Primary Impression:  Allergic reaction


Condition:  Stable





Additional Instructions:  


Your blood work today is unremarkable.  


Your kidney function is normal.  


Liver function tests and bilirubin is normal.  


Given that this rash has been intermittent for over a year now you will need to 

see a dermatologist outpatient.  I have given you a referral.  


Follow up with your primary care doctor in 2-3 days for repeat evaluation.  


Return to the ER for any new or worsening symptoms


Referrals:  


JUDAH RUDOLPH (PCP)








MILDRED AN MD


Time of Disposition:  22:44


I have reviewed the case, and I agree with, Diagnosis and Plan


I performed the substantive portion of the visit.  I have reviewed and person

ally made and approve the management plan that is documented in the note by 

myself or the MANJULA. I acknowledge for responsibility for the patient's management

plan.











RASHEED RONQUILLO NP             Percy 15, 2025 21:39


CHERRIE ALVAREZ                Percy 15, 2025 22:45

## 2025-01-27 ENCOUNTER — HOSPITAL ENCOUNTER (OUTPATIENT)
Dept: HOSPITAL 90 - LAB | Age: 40
Discharge: HOME | End: 2025-01-27
Attending: INTERNAL MEDICINE
Payer: COMMERCIAL

## 2025-01-27 DIAGNOSIS — R60.9: ICD-10-CM

## 2025-01-27 DIAGNOSIS — I10: Primary | ICD-10-CM

## 2025-01-27 LAB
ALBUMIN SERPL-MCNC: 3.4 G/DL (ref 3.5–5)
ALT SERPL-CCNC: 32 U/L (ref 12–78)
AST SERPL-CCNC: 27 U/L (ref 10–37)
BILIRUB SERPL-MCNC: 0.6 MG/DL (ref 0.2–1)
BUN SERPL-MCNC: 23 MG/DL (ref 7–18)
CHLORIDE SERPL-SCNC: 105 MMOL/L (ref 101–111)
CHOLEST SERPL-MCNC: 135 MG/DL (ref ?–200)
CO2 SERPL-SCNC: 24 MMOL/L (ref 21–32)
CREAT SERPL-MCNC: 1.2 MG/DL (ref 0.5–1.3)
GFR SERPL CREATININE-BSD FRML MDRD: 78 ML/MIN (ref 90–?)
GLUCOSE SERPL-MCNC: 88 MG/DL (ref 70–105)
HDLC SERPL-MCNC: 33 MG/DL (ref 29–71)
LDLC SERPL CALC-MCNC: 58 MG/DL (ref 0–99)
POTASSIUM SERPL-SCNC: 4.2 MMOL/L (ref 3.5–5.1)
PROT SERPL-MCNC: 7.3 G/DL (ref 6–8.3)
SODIUM SERPL-SCNC: 140 MMOL/L (ref 136–145)
TRIGL SERPL-MCNC: 288 MG/DL (ref 30–200)

## 2025-01-27 PROCEDURE — 36415 COLL VENOUS BLD VENIPUNCTURE: CPT

## 2025-01-27 PROCEDURE — 80053 COMPREHEN METABOLIC PANEL: CPT

## 2025-01-27 PROCEDURE — 80061 LIPID PANEL: CPT
